# Patient Record
Sex: FEMALE | Race: BLACK OR AFRICAN AMERICAN | NOT HISPANIC OR LATINO | Employment: FULL TIME | URBAN - METROPOLITAN AREA
[De-identification: names, ages, dates, MRNs, and addresses within clinical notes are randomized per-mention and may not be internally consistent; named-entity substitution may affect disease eponyms.]

---

## 2018-03-05 ENCOUNTER — LAB VISIT (OUTPATIENT)
Dept: LAB | Facility: HOSPITAL | Age: 33
End: 2018-03-05
Attending: OBSTETRICS & GYNECOLOGY
Payer: COMMERCIAL

## 2018-03-05 ENCOUNTER — OFFICE VISIT (OUTPATIENT)
Dept: OBSTETRICS AND GYNECOLOGY | Facility: CLINIC | Age: 33
End: 2018-03-05
Payer: COMMERCIAL

## 2018-03-05 DIAGNOSIS — Z11.3 SCREENING EXAMINATION FOR STD (SEXUALLY TRANSMITTED DISEASE): ICD-10-CM

## 2018-03-05 DIAGNOSIS — Z32.01 POSITIVE PREGNANCY TEST: Primary | ICD-10-CM

## 2018-03-05 DIAGNOSIS — Z32.01 POSITIVE PREGNANCY TEST: ICD-10-CM

## 2018-03-05 DIAGNOSIS — O09.521 AMA (ADVANCED MATERNAL AGE) MULTIGRAVIDA 35+, FIRST TRIMESTER: ICD-10-CM

## 2018-03-05 DIAGNOSIS — R11.15 INTRACTABLE CYCLICAL VOMITING WITH NAUSEA: ICD-10-CM

## 2018-03-05 DIAGNOSIS — Z12.4 PAP SMEAR FOR CERVICAL CANCER SCREENING: ICD-10-CM

## 2018-03-05 LAB
ABO + RH BLD: NORMAL
ALBUMIN SERPL BCP-MCNC: 4.7 G/DL
ALP SERPL-CCNC: 75 U/L
ALT SERPL W/O P-5'-P-CCNC: 13 U/L
ANION GAP SERPL CALC-SCNC: 15 MMOL/L
AST SERPL-CCNC: 17 U/L
B-HCG UR QL: POSITIVE
BASOPHILS # BLD AUTO: 0.02 K/UL
BASOPHILS NFR BLD: 0.2 %
BILIRUB SERPL-MCNC: 0.8 MG/DL
BLD GP AB SCN CELLS X3 SERPL QL: NORMAL
BUN SERPL-MCNC: 8 MG/DL
CALCIUM SERPL-MCNC: 10.7 MG/DL
CHLORIDE SERPL-SCNC: 99 MMOL/L
CO2 SERPL-SCNC: 21 MMOL/L
CREAT SERPL-MCNC: 0.8 MG/DL
CTP QC/QA: YES
DIFFERENTIAL METHOD: ABNORMAL
EOSINOPHIL # BLD AUTO: 0 K/UL
EOSINOPHIL NFR BLD: 0 %
ERYTHROCYTE [DISTWIDTH] IN BLOOD BY AUTOMATED COUNT: 13.9 %
EST. GFR  (AFRICAN AMERICAN): >60 ML/MIN/1.73 M^2
EST. GFR  (NON AFRICAN AMERICAN): >60 ML/MIN/1.73 M^2
GLUCOSE SERPL-MCNC: 78 MG/DL
HCG INTACT+B SERPL-ACNC: NORMAL MIU/ML
HCT VFR BLD AUTO: 38.5 %
HGB BLD-MCNC: 12.8 G/DL
LYMPHOCYTES # BLD AUTO: 1.5 K/UL
LYMPHOCYTES NFR BLD: 14.8 %
MCH RBC QN AUTO: 26.8 PG
MCHC RBC AUTO-ENTMCNC: 33.2 G/DL
MCV RBC AUTO: 81 FL
MONOCYTES # BLD AUTO: 0.8 K/UL
MONOCYTES NFR BLD: 8.4 %
NEUTROPHILS # BLD AUTO: 7.5 K/UL
NEUTROPHILS NFR BLD: 76.4 %
PLATELET # BLD AUTO: 254 K/UL
PMV BLD AUTO: 10.5 FL
POTASSIUM SERPL-SCNC: 4.1 MMOL/L
PROT SERPL-MCNC: 8.8 G/DL
RBC # BLD AUTO: 4.77 M/UL
SODIUM SERPL-SCNC: 135 MMOL/L
WBC # BLD AUTO: 9.81 K/UL

## 2018-03-05 PROCEDURE — 85025 COMPLETE CBC W/AUTO DIFF WBC: CPT

## 2018-03-05 PROCEDURE — 80053 COMPREHEN METABOLIC PANEL: CPT

## 2018-03-05 PROCEDURE — 87340 HEPATITIS B SURFACE AG IA: CPT

## 2018-03-05 PROCEDURE — 99999 PR PBB SHADOW E&M-NEW PATIENT-LVL III: CPT | Mod: PBBFAC,,, | Performed by: OBSTETRICS & GYNECOLOGY

## 2018-03-05 PROCEDURE — 86592 SYPHILIS TEST NON-TREP QUAL: CPT

## 2018-03-05 PROCEDURE — 81025 URINE PREGNANCY TEST: CPT | Mod: S$GLB,,, | Performed by: OBSTETRICS & GYNECOLOGY

## 2018-03-05 PROCEDURE — 86850 RBC ANTIBODY SCREEN: CPT

## 2018-03-05 PROCEDURE — 86762 RUBELLA ANTIBODY: CPT

## 2018-03-05 PROCEDURE — 86703 HIV-1/HIV-2 1 RESULT ANTBDY: CPT

## 2018-03-05 PROCEDURE — 84702 CHORIONIC GONADOTROPIN TEST: CPT

## 2018-03-05 PROCEDURE — 99203 OFFICE O/P NEW LOW 30 MIN: CPT | Mod: S$GLB,,, | Performed by: OBSTETRICS & GYNECOLOGY

## 2018-03-05 PROCEDURE — 88175 CYTOPATH C/V AUTO FLUID REDO: CPT

## 2018-03-05 PROCEDURE — 87086 URINE CULTURE/COLONY COUNT: CPT

## 2018-03-05 PROCEDURE — 87624 HPV HI-RISK TYP POOLED RSLT: CPT

## 2018-03-05 PROCEDURE — 87480 CANDIDA DNA DIR PROBE: CPT

## 2018-03-05 PROCEDURE — 87491 CHLMYD TRACH DNA AMP PROBE: CPT

## 2018-03-05 RX ORDER — ONDANSETRON 4 MG/1
4 TABLET, ORALLY DISINTEGRATING ORAL
Qty: 30 TABLET | Refills: 1 | Status: SHIPPED | OUTPATIENT
Start: 2018-03-05 | End: 2018-04-04

## 2018-03-05 RX ORDER — PROMETHAZINE HYDROCHLORIDE 25 MG/1
25 TABLET ORAL
Qty: 30 TABLET | Refills: 1 | Status: SHIPPED | OUTPATIENT
Start: 2018-03-05 | End: 2018-04-04

## 2018-03-05 NOTE — PROGRESS NOTES
CC: positive pregnancy test     HPI:   Loulou Gann 33 y.o.  at 4  is here for + UPT. She hasn't seen anyone yet for this pregnancy. She complains of N/V. She reports she hasn't been able to keep anything down for past 2 days but before that was fine.      Ob: 1  at  in  2/2 NRFHT at 40 wga weight 7lb 8 oz, 1 TAB with D&C      Patient's last menstrual period was 2018.     History reviewed. No pertinent past medical history.    History reviewed. No pertinent surgical history.    History reviewed. No pertinent family history.    Social History     Social History    Marital status: Single     Spouse name: N/A    Number of children: N/A    Years of education: N/A     Occupational History    Not on file.     Social History Main Topics    Smoking status: Not on file    Smokeless tobacco: Not on file    Alcohol use Not on file    Drug use: Unknown    Sexual activity: Not on file     Other Topics Concern    Not on file     Social History Narrative    No narrative on file       OB History      Para Term  AB Living    1              SAB TAB Ectopic Multiple Live Births                        COMPREHENSIVE GYN HISTORY:  PAP History: Had abn with colpo several years ago but has been normal since then.   Infection History: Denies STDs. Denies PID.  Benign History: Denies uterine fibroids. Denies ovarian cysts. Denies endometriosis.   Cancer History: Denies cervical cancer. Denies uterine cancer or hyperplasia. Denies ovarian cancer. Denies vulvar cancer or pre-cancer. Denies vaginal cancer or pre-cancer. Denies breast cancer. Denies colon cancer.  Sexual Activity History:   has no sexual activity history on file.   Has history of lsc ovary removed, unsure which one, 2/2 infection  At  in  but denies PID?         ROS:  GENERAL: Denies weight gain or weight loss. Feeling well overall.   SKIN: Denies rash or lesions.   HEAD: Denies headache.   NODES: Denies enlarged lymph  nodes.   CHEST: Denies shortness of breath or chest pain   ABDOMEN: No abdominal pain, constipation, diarrhea, nausea, vomiting or rectal bleeding.   URINARY: No frequency, dysuria, hematuria, or burning on urination.  REPRODUCTIVE: See HPI.   BREASTS: The patient denies pain, lumps, or nipple discharge.   HEMATOLOGIC: No easy bruisability.   MUSCULOSKELETAL: Denies joint pain or back pain.   NEUROLOGIC: Denies weakness.   PSYCHIATRIC: Denies depression, anxiety or mood swings.    PE:   LMP 02/01/2018     APPEARANCE: Well nourished, well developed, in no acute distress.  NECK: Neck symmetric without  thyromegaly.  NODES: No inguinal, cervical lymph node enlargement.  CHEST: Lungs clear to auscultation.  HEART: Regular rate and rhythm, no murmurs, rubs or gallops.  ABDOMEN: Soft. No tenderness or masses. No hernias.  BREASTS: Symmetrical, no skin changes or visible lesions. No palpable masses, nipple discharge or adenopathy bilaterally.  PELVIC:   VULVA: No lesions. Normal female genitalia.  URETHRAL MEATUS: Normal size and location, no lesions, no prolapse.  URETHRA: No masses, tenderness, prolapse or scarring.  VAGINA: Moist and well rugated, no discharge, no significant cystocele or rectocele.  CERVIX: No lesions and discharge.  UTERUS: 6 wk size, regular shape, mobile, non-tender, bladder base nontender.  ADNEXA: No masses or tenderness.    Urine dip + ketones     1. Positive pregnancy test    2. Screening examination for STD (sexually transmitted disease)    3. Pap smear for cervical cancer screening    4. Intractable cyclical vomiting with nausea    5. AMA (advanced maternal age) multigravida 35+, first trimester        Plan:    1. IOB labs and dating US ordered, PNV ordered.   2. Discussed basic N/V precautions. Eating small meals, dejon, vitamin B6 and 1/2 unisom but will also do phenergen and zofran. We discussed the possible associated risks with zofran but at this point she needs a medication she can take  and not have to swallow. Discussed risk of constipation and what to do about it. Will see back early next week to check weight and follow up. Discussed if not improved in 1-2 days should call office and we may need to send her to ER for IVF.   3. Will discuss risk of AMA once N/V improved.   4. Will discuss TOLAC vs  once N/V improved.

## 2018-03-06 LAB
C TRACH DNA SPEC QL NAA+PROBE: NOT DETECTED
CANDIDA RRNA VAG QL PROBE: NEGATIVE
G VAGINALIS RRNA GENITAL QL PROBE: POSITIVE
HBV SURFACE AG SERPL QL IA: NEGATIVE
HIV 1+2 AB+HIV1 P24 AG SERPL QL IA: NEGATIVE
N GONORRHOEA DNA SPEC QL NAA+PROBE: NOT DETECTED
RPR SER QL: NORMAL
RUBV IGG SER-ACNC: 12.3 IU/ML
RUBV IGG SER-IMP: REACTIVE
T VAGINALIS RRNA GENITAL QL PROBE: POSITIVE

## 2018-03-07 ENCOUNTER — TELEPHONE (OUTPATIENT)
Dept: OBSTETRICS AND GYNECOLOGY | Facility: CLINIC | Age: 33
End: 2018-03-07

## 2018-03-07 LAB
BACTERIA UR CULT: NORMAL
HPV16 AG SPEC QL: NEGATIVE
HPV16+18+H RISK 12 DNA CVX-IMP: NEGATIVE
HPV18 DNA SPEC QL NAA+PROBE: NEGATIVE

## 2018-03-07 RX ORDER — METRONIDAZOLE 500 MG/1
500 TABLET ORAL EVERY 12 HOURS
Qty: 14 TABLET | Refills: 0 | Status: SHIPPED | OUTPATIENT
Start: 2018-03-07 | End: 2018-03-14

## 2018-03-07 NOTE — TELEPHONE ENCOUNTER
----- Message from Mame Perdue sent at 3/7/2018  8:17 AM CST -----  Contact: self/[phone]  She is pregnant and would like to know what can she take for the heartburn beside rolaids.

## 2018-03-07 NOTE — TELEPHONE ENCOUNTER
Call patient to tell her she test + for trich and Bv. She didn't answer so message left for her to call us back. If she calls back please let her know. Please ask how her Nausea/emesis is doing and let her know this medication can sometimes leave a bad taste in her mouth or cause further nausea so we may have to wait until she is feeling better to take it. I would recommend taking a nausea medication about 30 minutes before trying to take it to see if that helps her tolerate it better.

## 2018-03-08 NOTE — TELEPHONE ENCOUNTER
She make take over the counter pepcid twice a day. This will help prevent the heartburn. Eat small meals throughout the day. Avoid spicy or greasy foods. Sit up for at least 30 minutes after eating.

## 2018-03-08 NOTE — TELEPHONE ENCOUNTER
Attempted to return pt's call, called the number in the chart, and a male answered saying that I had the wrong number.

## 2018-03-08 NOTE — TELEPHONE ENCOUNTER
----- Message from Garrick Clark MA sent at 3/7/2018  4:49 PM CST -----  Contact: self/[phone]      ----- Message -----  From: Mame Perdue  Sent: 3/7/2018   8:17 AM  To: [facility] Staff    She is pregnant and would like to know what can she take for the heartburn beside rolaids.

## 2018-03-14 ENCOUNTER — PROCEDURE VISIT (OUTPATIENT)
Dept: OBSTETRICS AND GYNECOLOGY | Facility: CLINIC | Age: 33
End: 2018-03-14
Payer: COMMERCIAL

## 2018-03-14 ENCOUNTER — INITIAL PRENATAL (OUTPATIENT)
Dept: OBSTETRICS AND GYNECOLOGY | Facility: CLINIC | Age: 33
End: 2018-03-14
Payer: COMMERCIAL

## 2018-03-14 VITALS — HEART RATE: 72 BPM | WEIGHT: 138 LBS | SYSTOLIC BLOOD PRESSURE: 134 MMHG | DIASTOLIC BLOOD PRESSURE: 82 MMHG

## 2018-03-14 DIAGNOSIS — R11.15 INTRACTABLE CYCLICAL VOMITING WITH NAUSEA: ICD-10-CM

## 2018-03-14 DIAGNOSIS — O30.009 TWIN PREGNANCY, ANTEPARTUM: Primary | ICD-10-CM

## 2018-03-14 DIAGNOSIS — Z3A.01 7 WEEKS GESTATION OF PREGNANCY: Primary | ICD-10-CM

## 2018-03-14 DIAGNOSIS — Z32.01 POSITIVE PREGNANCY TEST: ICD-10-CM

## 2018-03-14 DIAGNOSIS — O09.521 AMA (ADVANCED MATERNAL AGE) MULTIGRAVIDA 35+, FIRST TRIMESTER: ICD-10-CM

## 2018-03-14 DIAGNOSIS — O30.041 DICHORIONIC DIAMNIOTIC TWIN PREGNANCY IN FIRST TRIMESTER: ICD-10-CM

## 2018-03-14 PROCEDURE — 76817 TRANSVAGINAL US OBSTETRIC: CPT | Mod: S$GLB,,, | Performed by: OBSTETRICS & GYNECOLOGY

## 2018-03-14 PROCEDURE — 99999 PR PBB SHADOW E&M-EST. PATIENT-LVL II: CPT | Mod: PBBFAC,,, | Performed by: OBSTETRICS & GYNECOLOGY

## 2018-03-14 PROCEDURE — 0502F SUBSEQUENT PRENATAL CARE: CPT | Mod: S$GLB,,, | Performed by: OBSTETRICS & GYNECOLOGY

## 2018-03-14 RX ORDER — FAMOTIDINE 20 MG/1
20 TABLET, FILM COATED ORAL 2 TIMES DAILY
Qty: 60 TABLET | Refills: 2 | Status: SHIPPED | OUTPATIENT
Start: 2018-03-14 | End: 2018-04-30

## 2018-03-14 NOTE — PROGRESS NOTES
@ 7w4d  1. US today showed twin IUP, di-di. We discussed the risks associated with twins included stillbirth,  delivery, growth issues like IUGR increased risk of DM and HTN disorders in pregnancy. Discussed that we will need to do more frequent US every 4 weeks after 20 wga to measure the growth. We also discussed this is likely why her N/V was intense.   2. N/V: she reports the N/V is almost resolved without needed medications. She throws up once every other day. Desires medication for GERD. Will send in pepcid BID.   3. - Counseled on the risk between maternal age and genetic aneuploidy, including down syndrome.   -We discussed the risks and benefits of screening tests versus definitive genetic testing ([facility] and amniocentesis).  We discussed the limitations of ultrasound in the definitive diagnosis of Down Syndrome and other conditions.  I also discussed with the patient the option of non-invasive prenatal  testing, such as Nt/first trimester screening, Quad screen, Materni T21. She declines testing but will let us know if she changes her mind.

## 2018-04-11 ENCOUNTER — ROUTINE PRENATAL (OUTPATIENT)
Dept: OBSTETRICS AND GYNECOLOGY | Facility: CLINIC | Age: 33
End: 2018-04-11
Payer: COMMERCIAL

## 2018-04-11 VITALS — WEIGHT: 137.38 LBS | SYSTOLIC BLOOD PRESSURE: 120 MMHG | DIASTOLIC BLOOD PRESSURE: 77 MMHG

## 2018-04-11 DIAGNOSIS — Z3A.11 11 WEEKS GESTATION OF PREGNANCY: Primary | ICD-10-CM

## 2018-04-11 DIAGNOSIS — O30.041 DICHORIONIC DIAMNIOTIC TWIN PREGNANCY IN FIRST TRIMESTER: ICD-10-CM

## 2018-04-11 DIAGNOSIS — O09.521 AMA (ADVANCED MATERNAL AGE) MULTIGRAVIDA 35+, FIRST TRIMESTER: ICD-10-CM

## 2018-04-11 PROCEDURE — 99999 PR PBB SHADOW E&M-EST. PATIENT-LVL II: CPT | Mod: PBBFAC,,, | Performed by: OBSTETRICS & GYNECOLOGY

## 2018-04-11 PROCEDURE — 99212 OFFICE O/P EST SF 10 MIN: CPT | Mod: TH,S$GLB,, | Performed by: OBSTETRICS & GYNECOLOGY

## 2018-04-12 NOTE — PROGRESS NOTES
@ 11 5/7 wga   1. C/o depression. She reports she has lost interest in things and seems to be more angry. She reports her job is stressful and she is going to cut back her hours. She reports this pregnancy was unexpected and she was very surprised it was twins. We discussed SSRIs in pregnancy and she is not currently interested in medications or seeing psych. Will keep us updated as to her feelings.   2. N/V resolved and is more spitting than anything. Lost a small amount of weight but no ketones in urine.   3. IOB labs reviewed

## 2018-04-18 ENCOUNTER — NURSE TRIAGE (OUTPATIENT)
Dept: ADMINISTRATIVE | Facility: CLINIC | Age: 33
End: 2018-04-18

## 2018-04-19 ENCOUNTER — PROCEDURE VISIT (OUTPATIENT)
Dept: OBSTETRICS AND GYNECOLOGY | Facility: CLINIC | Age: 33
End: 2018-04-19
Payer: COMMERCIAL

## 2018-04-19 ENCOUNTER — ROUTINE PRENATAL (OUTPATIENT)
Dept: OBSTETRICS AND GYNECOLOGY | Facility: CLINIC | Age: 33
End: 2018-04-19
Payer: COMMERCIAL

## 2018-04-19 VITALS — WEIGHT: 136 LBS | HEART RATE: 84 BPM | SYSTOLIC BLOOD PRESSURE: 122 MMHG | DIASTOLIC BLOOD PRESSURE: 60 MMHG

## 2018-04-19 DIAGNOSIS — O46.90 VAGINAL BLEEDING IN PREGNANCY: ICD-10-CM

## 2018-04-19 DIAGNOSIS — O20.0 THREATENED ABORTION: Primary | ICD-10-CM

## 2018-04-19 DIAGNOSIS — O30.041 DICHORIONIC DIAMNIOTIC TWIN PREGNANCY IN FIRST TRIMESTER: ICD-10-CM

## 2018-04-19 DIAGNOSIS — O30.041 DICHORIONIC DIAMNIOTIC TWIN PREGNANCY IN FIRST TRIMESTER: Primary | ICD-10-CM

## 2018-04-19 DIAGNOSIS — Z3A.12 12 WEEKS GESTATION OF PREGNANCY: ICD-10-CM

## 2018-04-19 DIAGNOSIS — O09.521 AMA (ADVANCED MATERNAL AGE) MULTIGRAVIDA 35+, FIRST TRIMESTER: ICD-10-CM

## 2018-04-19 PROCEDURE — 87480 CANDIDA DNA DIR PROBE: CPT

## 2018-04-19 PROCEDURE — 0502F SUBSEQUENT PRENATAL CARE: CPT | Mod: S$GLB,,, | Performed by: OBSTETRICS & GYNECOLOGY

## 2018-04-19 PROCEDURE — 87510 GARDNER VAG DNA DIR PROBE: CPT

## 2018-04-19 PROCEDURE — 99999 PR PBB SHADOW E&M-EST. PATIENT-LVL III: CPT | Mod: PBBFAC,,, | Performed by: OBSTETRICS & GYNECOLOGY

## 2018-04-19 PROCEDURE — 87086 URINE CULTURE/COLONY COUNT: CPT

## 2018-04-19 PROCEDURE — 76802 OB US < 14 WKS ADDL FETUS: CPT | Mod: S$GLB,,, | Performed by: OBSTETRICS & GYNECOLOGY

## 2018-04-19 PROCEDURE — 76801 OB US < 14 WKS SINGLE FETUS: CPT | Mod: S$GLB,,, | Performed by: OBSTETRICS & GYNECOLOGY

## 2018-04-19 RX ORDER — FLUCONAZOLE 150 MG/1
150 TABLET ORAL ONCE
Qty: 1 TABLET | Refills: 0 | Status: SHIPPED | OUTPATIENT
Start: 2018-04-19 | End: 2018-04-19

## 2018-04-19 NOTE — PROGRESS NOTES
@ 12 5/7 wga   1. Reports she started having spotting, at first pinkish but now brownish when she wipes, hasn't put on a panty liner, treated herself for yeast infection last week, hasn't had intercourse. Denies pain or cramping, had one cramp last week and had a burning when she urinated once yesterday but none today. + brown clumpy discharge c/w yeast. Urine dip with blood and leuk est, likely 2/2 yeast infection and vaginal bleeding but will send affirm and urine for Ua.  Discussed trich which should have been treated with flagyl which she tolerated so will retest today.   Had US shows twin IUP both with FHT, no subchorionic hemorrhage.  2. Denies N/V and reports she has been eating well. Will continue to monitor weight.

## 2018-04-19 NOTE — TELEPHONE ENCOUNTER
"  Reason for Disposition   Burning with urination    Answer Assessment - Initial Assessment Questions  1. ONSET: "When did this bleeding start?"        Light spotting  2. DESCRIPTION: "Describe the bleeding that you are having." "How much bleeding is there?"     - SPOTTING: spotting, or pinkish / brownish mucous discharge; does not fill panti-liner or pad     - MILD:  less than 1 pad / hour; less than patient's usual menstrual bleeding    - MODERATE: 1-2 pads / hour; small-medium blood clots (e.g., pea, grape, small coin)     - SEVERE: soaking 2 or more pads/hour for 2 or more hours; bleeding not contained by pads or continuous red blood from vagina; large blood clots (e.g., golf ball, large coin)       spotting  3. ABDOMINAL PAIN SEVERITY: If present, ask: "How bad is it?"  (e.g., Scale 1-10; mild, moderate, or severe)    - MILD (1-3): doesn't interfere with normal activities, abdomen soft and not tender to touch     - MODERATE (4-7): interferes with normal activities or awakens from sleep, tender to touch     - SEVERE (8-10): excruciating pain, doubled over, unable to do any normal activities      no  4. PREGNANCY: "Do you know how many weeks or months pregnant you are?" "When was the first day of your last normal menstrual period?"      12  5. HEMODYNAMIC STATUS: "Are you weak or feeling lightheaded?" If so, ask: "Can you stand and walk normally?"       no  6. OTHER SYMPTOMS: "What other symptoms are you having with the bleeding?" (e.g., passed tissue, vaginal discharge, fever, menstrual-type cramps)      no    Protocols used: ST PREGNANCY - VAGINAL BLEEDING LESS THAN 20 WEEKS EGA-A-    "

## 2018-04-20 LAB
CANDIDA RRNA VAG QL PROBE: POSITIVE
G VAGINALIS RRNA GENITAL QL PROBE: NEGATIVE
T VAGINALIS RRNA GENITAL QL PROBE: NEGATIVE

## 2018-04-21 LAB
BACTERIA UR CULT: NORMAL
BACTERIA UR CULT: NORMAL

## 2018-04-22 ENCOUNTER — HOSPITAL ENCOUNTER (EMERGENCY)
Facility: HOSPITAL | Age: 33
Discharge: HOME | End: 2018-04-23
Attending: EMERGENCY MEDICINE
Payer: COMMERCIAL

## 2018-04-22 DIAGNOSIS — O20.0 THREATENED ABORTION: Primary | ICD-10-CM

## 2018-04-22 LAB
ALBUMIN SERPL BCP-MCNC: 3.1 G/DL
ALP SERPL-CCNC: 66 U/L
ALT SERPL W/O P-5'-P-CCNC: 14 U/L
ANION GAP SERPL CALC-SCNC: 11 MMOL/L
AST SERPL-CCNC: 14 U/L
BACTERIA #/AREA URNS HPF: ABNORMAL /HPF
BASOPHILS # BLD AUTO: 0.01 K/UL
BASOPHILS NFR BLD: 0.1 %
BILIRUB SERPL-MCNC: 0.2 MG/DL
BILIRUB UR QL STRIP: NEGATIVE
BUN SERPL-MCNC: 10 MG/DL
CALCIUM SERPL-MCNC: 9.3 MG/DL
CHLORIDE SERPL-SCNC: 105 MMOL/L
CLARITY UR: CLEAR
CO2 SERPL-SCNC: 19 MMOL/L
COLOR UR: YELLOW
CREAT SERPL-MCNC: 0.6 MG/DL
DIFFERENTIAL METHOD: ABNORMAL
EOSINOPHIL # BLD AUTO: 0.1 K/UL
EOSINOPHIL NFR BLD: 0.5 %
ERYTHROCYTE [DISTWIDTH] IN BLOOD BY AUTOMATED COUNT: 13.8 %
EST. GFR  (AFRICAN AMERICAN): >60 ML/MIN/1.73 M^2
EST. GFR  (NON AFRICAN AMERICAN): >60 ML/MIN/1.73 M^2
GLUCOSE SERPL-MCNC: 92 MG/DL
GLUCOSE UR QL STRIP: NEGATIVE
HCT VFR BLD AUTO: 32.6 %
HGB BLD-MCNC: 10.7 G/DL
HGB UR QL STRIP: ABNORMAL
KETONES UR QL STRIP: NEGATIVE
LEUKOCYTE ESTERASE UR QL STRIP: NEGATIVE
LYMPHOCYTES # BLD AUTO: 1.8 K/UL
LYMPHOCYTES NFR BLD: 16.2 %
MCH RBC QN AUTO: 26.3 PG
MCHC RBC AUTO-ENTMCNC: 32.8 G/DL
MCV RBC AUTO: 80 FL
MICROSCOPIC COMMENT: ABNORMAL
MONOCYTES # BLD AUTO: 1 K/UL
MONOCYTES NFR BLD: 8.7 %
NEUTROPHILS # BLD AUTO: 8.4 K/UL
NEUTROPHILS NFR BLD: 73.9 %
NITRITE UR QL STRIP: NEGATIVE
PH UR STRIP: 6 [PH] (ref 5–8)
PLATELET # BLD AUTO: 254 K/UL
PMV BLD AUTO: 9.6 FL
POTASSIUM SERPL-SCNC: 3.8 MMOL/L
PROT SERPL-MCNC: 6.5 G/DL
PROT UR QL STRIP: NEGATIVE
RBC # BLD AUTO: 4.07 M/UL
RBC #/AREA URNS HPF: 5 /HPF (ref 0–4)
SODIUM SERPL-SCNC: 135 MMOL/L
SP GR UR STRIP: 1.02 (ref 1–1.03)
SQUAMOUS #/AREA URNS HPF: 4 /HPF
URN SPEC COLLECT METH UR: ABNORMAL
UROBILINOGEN UR STRIP-ACNC: NEGATIVE EU/DL
WBC # BLD AUTO: 11.32 K/UL
WBC #/AREA URNS HPF: 3 /HPF (ref 0–5)

## 2018-04-22 PROCEDURE — 80053 COMPREHEN METABOLIC PANEL: CPT

## 2018-04-22 PROCEDURE — 85025 COMPLETE CBC W/AUTO DIFF WBC: CPT

## 2018-04-22 PROCEDURE — 99284 EMERGENCY DEPT VISIT MOD MDM: CPT

## 2018-04-22 PROCEDURE — 84702 CHORIONIC GONADOTROPIN TEST: CPT

## 2018-04-22 PROCEDURE — 86901 BLOOD TYPING SEROLOGIC RH(D): CPT

## 2018-04-22 PROCEDURE — 81000 URINALYSIS NONAUTO W/SCOPE: CPT

## 2018-04-23 ENCOUNTER — TELEPHONE (OUTPATIENT)
Dept: OBSTETRICS AND GYNECOLOGY | Facility: HOSPITAL | Age: 33
End: 2018-04-23

## 2018-04-23 VITALS
TEMPERATURE: 99 F | RESPIRATION RATE: 16 BRPM | BODY MASS INDEX: 24.1 KG/M2 | HEART RATE: 73 BPM | SYSTOLIC BLOOD PRESSURE: 128 MMHG | OXYGEN SATURATION: 98 % | HEIGHT: 63 IN | DIASTOLIC BLOOD PRESSURE: 68 MMHG | WEIGHT: 136 LBS

## 2018-04-23 LAB
ABO + RH BLD: NORMAL
BLD GP AB SCN CELLS X3 SERPL QL: NORMAL
HCG INTACT+B SERPL-ACNC: NORMAL MIU/ML

## 2018-04-23 NOTE — ED NOTES
Pt to ED with complaints of vaginal bleeding. Pt states that she had some spotting on April 18, Wednesday, that ended by April 20, Friday. States she visited her OBGYN on the 19th, Thursday, to which her US was stated to be fine with no abnormalities with the pt's ovaries. Pt is 13 weeks pregnant by this time. States that by today around 2030, she began to have heavier spotting than her previous occurrence. States that she notices more blood when wiping. States very slight pain rated at a 1 out of 10 to the lower anterior pelvis.     APPEARANCE: Alert, oriented and in no acute distress.  CARDIAC: Normal rate and rhythm. S1S2 noted  PERIPHERAL VASCULAR: peripheral pulses present. Normal cap refill. No edema. Warm to touch. 2+ radial pulses  RESPIRATORY:Normal rate and effort, breath sounds clear bilaterally throughout chest. Respirations are equal and unlabored no obvious signs of distress.  MUSC: Full ROM. No bony tenderness or soft tissue tenderness. No obvious deformity.  : Pt states 1 out of 10 pain to the anterior inferior pelvis. States she has had bleeding from her vagina starting around 2030 tonight, with the pain occurring after initial spotting.   SKIN: Skin is warm and dry, normal skin turgor, mucous membranes moist.  NEURO: 5/5 strength major flexors/extensors bilaterally. Sensory intact to light touch bilaterally. [place] coma scale: eyes open spontaneously-4, oriented & converses-5, obeys commands-6. No neurological abnormalities.   MENTAL STATUS: awake, alert and aware of environment.  EYE: No obvious discharge.  ENT: EARS: no obvious drainage. NOSE: no active bleeding.

## 2018-04-23 NOTE — ED PROVIDER NOTES
Encounter Date: 4/22/2018    SCRIBE #1 NOTE: IViry, sandra scribing for, and in the presence of, Dr. Leonard Vega.       History     Chief Complaint   Patient presents with    Vaginal Bleeding     33y F ambulatory to ED with c/o vaginal spotting. pt is 13 weeks pregnant. pt started spotting on wednesday, was seen by OBGYN on thursday, and has started spotting again tonight.      Time seen by provider: 12:41 AM     This is a 33 y.o. female, 13 weeks pregnant, who presents to the emergency department today with complaint of intermittent vaginal spotting onset 4 days ago. She denies abdominal pain, any other discomfort.    Patient has a past surgical history that includes Oophorectomy.       The history is provided by the patient.     Review of patient's allergies indicates:  No Known Allergies  History reviewed. No pertinent past medical history.  Past Surgical History:   Procedure Laterality Date    OOPHORECTOMY       History reviewed. No pertinent family history.  Social History   Substance Use Topics    Smoking status: Never Smoker    Smokeless tobacco: Never Used    Alcohol use No     Review of Systems   Gastrointestinal: Negative for abdominal pain.   Genitourinary: Positive for vaginal bleeding.   All other systems reviewed and are negative.      Physical Exam     Initial Vitals [04/22/18 2223]   BP Pulse Resp Temp SpO2   122/63 96 18 98.6 °F (37 °C) 100 %      MAP       82.67         Physical Exam    Nursing note and vitals reviewed.  Constitutional: She appears well-developed and well-nourished. No distress.   HENT:   Head: Normocephalic and atraumatic.   Mouth/Throat: Oropharynx is clear and moist.   Eyes: Conjunctivae and EOM are normal. No scleral icterus.   Neck: Normal range of motion. Neck supple.   Pulmonary/Chest: No respiratory distress.   Abdominal: Soft. She exhibits no distension. There is no tenderness.   Genitourinary:   Genitourinary Comments: Some brownish discharge in vault, cervical  os is closed. No uterine tenderness.   Musculoskeletal: Normal range of motion.   Neurological: She is alert and oriented to person, place, and time. She has normal strength.   Skin: Skin is warm and dry.   Psychiatric: She has a normal mood and affect.         ED Course   Procedures  Labs Reviewed   CBC W/ AUTO DIFFERENTIAL - Abnormal; Notable for the following:        Result Value    Hemoglobin 10.7 (*)     Hematocrit 32.6 (*)     MCV 80 (*)     MCH 26.3 (*)     Gran # (ANC) 8.4 (*)     Gran% 73.9 (*)     Lymph% 16.2 (*)     All other components within normal limits   COMPREHENSIVE METABOLIC PANEL - Abnormal; Notable for the following:     Sodium 135 (*)     CO2 19 (*)     Albumin 3.1 (*)     All other components within normal limits   URINALYSIS - Abnormal; Notable for the following:     Occult Blood UA 3+ (*)     All other components within normal limits   URINALYSIS MICROSCOPIC - Abnormal; Notable for the following:     RBC, UA 5 (*)     All other components within normal limits   HCG, QUANTITATIVE, PREGNANCY   TYPE & SCREEN        Imaging Results          US OB Limited 1 Or More Gestations (Final result)  Result time 04/23/18 00:26:00   Procedure changed from US OB Less Than 14 Wks with Transvag(xpd     Final result by Silvio Roman MD (04/23/18 00:26:00)                 Impression:      Dichorionic-diamniotic intrauterine gestation corresponding to 14 weeks and 1 day and 13 weeks and 6 days with expected date of delivery of 10/20-22/2018.  Additional details as above.  No complications noted.      Electronically signed by: Silvio Roman MD  Date:    04/23/2018  Time:    00:26             Narrative:    EXAMINATION:  US OB LIMITED 1 OR MORE GESTATIONS    CLINICAL HISTORY:  Vag Bleeding;    TECHNIQUE:  Limited transabdominal pelvic ultrasound was performed.    COMPARISON:  None    FINDINGS:  There is a dichorionic diamniotic intrauterine gestation.  The cervix is closed.  The cervical length is within normal  "limits measuring 4.3 cm.    Baby "A",  which is to the maternal left has average gestational age of 14 weeks and 1 day.  The expected date of delivery is 10/20/2018.  The fetal heart rate is 161 beats per minute.  The assessed fetal biometrics including the biparietal diameter, head circumference, abdominal circumference, and femur length are within normal limits.  The placenta is located posteriorly.  The amniotic fluid appears adequate on visual assessment.  No perigestational collection is identified.    Baby "B", which is to the maternal right has a gestational age of 13 weeks and 6 days with expected date of delivery of 10/22/2018.  The fetal heart rate is 167 beats per minute.  The assessed fetal biometrics including the biparietal diameter, head circumference, abdominal circumference, and femur length are within normal limits.  The placenta is located anteriorly. The amniotic fluid appears adequate on visual assessment. No perigestational collection is identified.                                  Medical Decision Making:   ED Management:  Cervix is closed on exam. Scant brownish discharge. Pt will be dc'd home w instructions to call ob tomorrow              Attending Attestation:           Physician Attestation for Scribe:  Physician Attestation Statement for Scribe #1: I, Nick Vega, reviewed documentation, as scribed by Viry Santos in my presence, and it is both accurate and complete.                    Clinical Impression:     1. Threatened          Disposition:   Disposition: Discharged  Condition: Stable                         Nick Vega MD  [phone]    "

## 2018-04-23 NOTE — TELEPHONE ENCOUNTER
Please let patient know that her vaginal swab was + for yeast like we suspected. The diflucan should help clear that up but if it is still occurring then let us know and we can treat it again. It was negative for the trichomonas.

## 2018-04-26 ENCOUNTER — PATIENT MESSAGE (OUTPATIENT)
Dept: OBSTETRICS AND GYNECOLOGY | Facility: CLINIC | Age: 33
End: 2018-04-26

## 2018-04-27 ENCOUNTER — TELEPHONE (OUTPATIENT)
Dept: OBSTETRICS AND GYNECOLOGY | Facility: CLINIC | Age: 33
End: 2018-04-27

## 2018-04-27 NOTE — TELEPHONE ENCOUNTER
----- Message from Yoli Kitchen sent at 4/27/2018  9:53 AM CDT -----  Contact: Self [phone]  Patient Returning Your Phone Call

## 2018-04-27 NOTE — TELEPHONE ENCOUNTER
----- Message from Shelby Bull sent at 4/27/2018  9:02 AM CDT -----  No. [phone]   OB patient has an appointment on 5/9/18.  She is still having a brown discharge.  Does she need to come in sooner.    Please call.

## 2018-04-30 ENCOUNTER — ROUTINE PRENATAL (OUTPATIENT)
Dept: OBSTETRICS AND GYNECOLOGY | Facility: CLINIC | Age: 33
End: 2018-04-30
Payer: COMMERCIAL

## 2018-04-30 VITALS
WEIGHT: 138.44 LBS | DIASTOLIC BLOOD PRESSURE: 70 MMHG | BODY MASS INDEX: 24.53 KG/M2 | SYSTOLIC BLOOD PRESSURE: 106 MMHG

## 2018-04-30 DIAGNOSIS — O09.521 AMA (ADVANCED MATERNAL AGE) MULTIGRAVIDA 35+, FIRST TRIMESTER: ICD-10-CM

## 2018-04-30 DIAGNOSIS — O46.90 VAGINAL BLEEDING IN PREGNANCY: Primary | ICD-10-CM

## 2018-04-30 DIAGNOSIS — N89.8 VAGINAL DISCHARGE: ICD-10-CM

## 2018-04-30 DIAGNOSIS — O30.041 DICHORIONIC DIAMNIOTIC TWIN PREGNANCY IN FIRST TRIMESTER: ICD-10-CM

## 2018-04-30 PROCEDURE — 99212 OFFICE O/P EST SF 10 MIN: CPT | Mod: TH,S$GLB,, | Performed by: OBSTETRICS & GYNECOLOGY

## 2018-04-30 PROCEDURE — 87510 GARDNER VAG DNA DIR PROBE: CPT

## 2018-04-30 PROCEDURE — 99999 PR PBB SHADOW E&M-EST. PATIENT-LVL III: CPT | Mod: PBBFAC,,, | Performed by: OBSTETRICS & GYNECOLOGY

## 2018-04-30 PROCEDURE — 87491 CHLMYD TRACH DNA AMP PROBE: CPT

## 2018-04-30 PROCEDURE — 87480 CANDIDA DNA DIR PROBE: CPT

## 2018-04-30 RX ORDER — TERCONAZOLE 4 MG/G
1 CREAM VAGINAL DAILY
Qty: 1 TUBE | Refills: 1 | Status: SHIPPED | OUTPATIENT
Start: 2018-04-30 | End: 2018-05-16

## 2018-04-30 NOTE — PROGRESS NOTES
@ 14 2/7 wga   1. Di-di twins with vaginal bleeding: alternates between bright red and brown and clumpy. Had improved after treatment for yeast and is no longer having itching. Is a scant amount though last Sunday had increased to having to put on a panty liner that was heavier but no clots and didn't saturate a whole pad and only lasted a few hours but now is very scant brown. We treated for the yeast.  On pelvic exam: cervix closed, a scant amount of brown clumpy discharge still c/w yeast infection mixed with old blood, no active VB seen. Normal appearing nabothian cyst at 6 oclock on cervix, cervix closed on exam. Has had several US with no obvious cause of bleeding. US shows di-di twins with no obvious cause of bleeding, MVP 3.9/4 and FHT normal on both.  I suspect it is a cervicitis type picture 2/2 chronic yeast, will treat with vaginal cream. We discussed bleeding precautions and that we can't always seen bleeding on ultrasound. However will continue pelvic rest. If not resolved in a week will make appointment with M.   2. Nausea/emeisis resolved.

## 2018-05-01 LAB
CANDIDA RRNA VAG QL PROBE: NEGATIVE
G VAGINALIS RRNA GENITAL QL PROBE: NEGATIVE
T VAGINALIS RRNA GENITAL QL PROBE: NEGATIVE

## 2018-05-03 LAB
C TRACH DNA SPEC QL NAA+PROBE: NOT DETECTED
N GONORRHOEA DNA SPEC QL NAA+PROBE: NOT DETECTED

## 2018-05-09 ENCOUNTER — ROUTINE PRENATAL (OUTPATIENT)
Dept: OBSTETRICS AND GYNECOLOGY | Facility: CLINIC | Age: 33
End: 2018-05-09
Payer: COMMERCIAL

## 2018-05-09 VITALS
BODY MASS INDEX: 24.37 KG/M2 | SYSTOLIC BLOOD PRESSURE: 112 MMHG | WEIGHT: 137.56 LBS | DIASTOLIC BLOOD PRESSURE: 64 MMHG

## 2018-05-09 DIAGNOSIS — O30.041 DICHORIONIC DIAMNIOTIC TWIN PREGNANCY IN FIRST TRIMESTER: ICD-10-CM

## 2018-05-09 DIAGNOSIS — Z3A.15 15 WEEKS GESTATION OF PREGNANCY: Primary | ICD-10-CM

## 2018-05-09 DIAGNOSIS — O09.521 AMA (ADVANCED MATERNAL AGE) MULTIGRAVIDA 35+, FIRST TRIMESTER: ICD-10-CM

## 2018-05-09 PROCEDURE — 0502F SUBSEQUENT PRENATAL CARE: CPT | Mod: S$GLB,,, | Performed by: OBSTETRICS & GYNECOLOGY

## 2018-05-09 PROCEDURE — 99999 PR PBB SHADOW E&M-EST. PATIENT-LVL II: CPT | Mod: PBBFAC,,, | Performed by: OBSTETRICS & GYNECOLOGY

## 2018-05-09 NOTE — PROGRESS NOTES
@ 15 4/7 wga   1. Spotting stopped after second treatment for yeast  2. No nausea but lost 1lbs, encouraged to eat more frequent meals  3. Will wait 2-3 more weeks and VB doesn't restart then will start low dose ASA   4. Has MFM appt next week.

## 2018-05-16 ENCOUNTER — OFFICE VISIT (OUTPATIENT)
Dept: MATERNAL FETAL MEDICINE | Facility: HOSPITAL | Age: 33
End: 2018-05-16
Attending: OBSTETRICS & GYNECOLOGY
Payer: COMMERCIAL

## 2018-05-16 VITALS — WEIGHT: 136 LBS | SYSTOLIC BLOOD PRESSURE: 100 MMHG | DIASTOLIC BLOOD PRESSURE: 60 MMHG | BODY MASS INDEX: 24.09 KG/M2

## 2018-05-16 DIAGNOSIS — O30.042 DICHORIONIC DIAMNIOTIC TWIN PREGNANCY IN SECOND TRIMESTER: ICD-10-CM

## 2018-05-16 DIAGNOSIS — O34.219 PREGNANCY WITH HISTORY OF CESAREAN SECTION, ANTEPARTUM: ICD-10-CM

## 2018-05-16 DIAGNOSIS — O30.002 TWIN GESTATION IN SECOND TRIMESTER, UNSPECIFIED MULTIPLE GESTATION TYPE: Primary | ICD-10-CM

## 2018-05-16 DIAGNOSIS — Z36.3 ANTENATAL SCREENING FOR MALFORMATION USING ULTRASONICS: ICD-10-CM

## 2018-05-16 DIAGNOSIS — O09.522 ELDERLY MULTIGRAVIDA IN SECOND TRIMESTER: ICD-10-CM

## 2018-05-16 PROCEDURE — 76816 OB US FOLLOW-UP PER FETUS: CPT | Mod: 59

## 2018-05-16 PROCEDURE — 76817 TRANSVAGINAL US OBSTETRIC: CPT

## 2018-05-16 PROCEDURE — 3008F BODY MASS INDEX DOCD: CPT | Mod: CPTII,,, | Performed by: OBSTETRICS & GYNECOLOGY

## 2018-05-16 PROCEDURE — 99203 OFFICE O/P NEW LOW 30 MIN: CPT | Mod: 25,,, | Performed by: OBSTETRICS & GYNECOLOGY

## 2018-05-16 PROCEDURE — 76816 OB US FOLLOW-UP PER FETUS: CPT | Mod: 26,59,, | Performed by: OBSTETRICS & GYNECOLOGY

## 2018-05-16 PROCEDURE — 76817 TRANSVAGINAL US OBSTETRIC: CPT | Mod: 26,,, | Performed by: OBSTETRICS & GYNECOLOGY

## 2018-05-16 NOTE — PROGRESS NOTES
Indication  ========    AMA, Di/Di twin gestation: limited fetal evaluation and cervical length screen (Dr. Beach).    History  ======    General History  Other: Di/Di twin gestation  AMA  C/S x 1  Previous Outcomes  Preg. no. 1  Outcome: Live birth  Gender: male  Details: C/S   2  Para 1  [facility] living children (L) 1    Pregnancy History  ==============    Maternal Lab Tests  Genetic screening declined.      Maternal Assessment  =================    Weight 62 kg  Weight (lb) 136 lb  BP syst 100 mmHg  BP diast 70 mmHg    Method  ======    Transabdominal ultrasound examination. View: Good view.    Pregnancy  =========    Twin pregnancy. Dichorionic-diamniotic. Number of fetuses: 2.    Dating  ======    LMP on: 2018  Cycle: regular cycle  GA by LMP 14 w + 6 d  MARY by LMP: 2018  Ultrasound examination on: 2018  GA by U/S based upon: AC, BPD, Femur, HC  GA by U/S 17 w + 2 d  MARY by U/S: 10/22/2018  GA by U/S based upon (Fetus 2): AC, BPD, Femur, HC  GA by U/S (Fetus 2) 16 w + 6 d  MARY by U/S (Fetus 2): 10/25/2018  Assigned: Dating performed on 2018, based on ultrasound (Pregnancy 1: CRL)  Assigned GA 16 w + 4 d  Assigned MARY: 10/27/2018    General Evaluation (1)  =================    Cardiac activity: present.  bpm.  Fetal movements: visualized.  Presentation: cephalic left; presenting twin.  Placenta:  Placental site: posterior.  Umbilical cord: Cord vessels: 3 vessel cord. Cord insertion: placental insertion: normal.  Amniotic fluid: Amount of AF: normal amount.    Fetal Biometry (1)  ==============    Fetal Biometry  BPD 37.6 mm 17w 3d Hadlock  .5 mm 17w 3d Hadlock  .7 mm 17w 1d Hadlock  Femur 24.0 mm 17w 2d Hadlock  Humerus 23.2 mm 17w 1d Teodoro   g  Calculated by: Hadlock (BPD-HC-AC-FL)  EFW (lb) 0 lb  EFW (oz) 7 oz  EFW discordance 9.2 %  HC / AC 1.24  FL / BPD 0.64  FL / AC 0.21   bpm    Fetal Anatomy (1)  ==============    Cranium: appears  normal  Lateral ventricles: suboptimal  Choroid plexus: normal  Midline falx: suboptimal  Cavum septi pellucidi: suboptimal  Posterior fossa: normal  Lips: suboptimal  Profile: suboptimal  Nose: suboptimal  4-chamber view: suboptimal  RVOT: suboptimal  LVOT: suboptimal  Situs: normal  Aortic arch: suboptimal  Ductal arch: suboptimal  SVC: suboptimal  IVC: suboptimal  3-vessel view: suboptimal  3-vessel-trachea view: suboptimal  Diaphragm: suboptimal  Cord insertion: suboptimal  Stomach: normal  Kidneys: normal  Bladder: normal  Genitals: normal  Cervical spine: normal  Thoracic spine: normal  Lumbar spine: normal  Sacral spine: normal  Rt upper arm: normal  Rt forearm: normal  Rt hand: suboptimal  Lt upper arm: normal  Lt forearm: normal  Lt hand: normal  Rt upper leg: normal  Rt lower leg: suboptimal  Rt foot: suboptimal  Lt upper leg: normal  Lt lower leg: suboptimal  Lt foot: suboptimal  Position of hands: suboptimal  Position of feet: suboptimal  Gender: female  Wants to know gender: yes    General Evaluation (2)  =================    Cardiac activity: present.  bpm.  Fetal movements: visualized.  Presentation: cephalic right.  Placenta:  Placental site: anterior.  Umbilical cord: Cord vessels: 3 vessel cord; placental cord insertion wnl.  Amniotic fluid: Amount of AF: normal amount.    Fetal Biometry (2)  ==============    Fetal Biometry  BPD 34.7 mm 16w 5d Hadlock  .0 mm 17w 0d Hadlock  .5 mm 17w 0d Hadlock  Femur 21.8 mm 16w 4d Hadlock  Humerus 21.2 mm 16w 3d Teodoro   g  Calculated by: Hadlock (BPD-HC-AC-FL)  EFW (lb) 0 lb  EFW (oz) 6 oz  EFW discordance 9.2 %  HC / AC 1.21  FL / BPD 0.63  FL / AC 0.20   bpm    Fetal Anatomy (2)  ==============    Cranium: appears normal  Lateral ventricles: suboptimal  Choroid plexus: normal  Midline falx: suboptimal  Cavum septi pellucidi: suboptimal  Posterior fossa: normal  Lips: suboptimal  Profile: suboptimal  Nose: suboptimal  4-chamber  "view: suboptimal  RVOT: suboptimal  LVOT: suboptimal  Situs: normal  Aortic arch: suboptimal  Ductal arch: suboptimal  SVC: suboptimal  IVC: suboptimal  3-vessel view: suboptimal  3-vessel-trachea view: suboptimal  Diaphragm: suboptimal  Cord insertion: normal  Stomach: normal  Kidneys: normal  Bladder: normal  Genitals: normal  Rt upper arm: normal  Rt forearm: normal  Rt hand: normal  Lt upper arm: normal  Lt forearm: normal  Lt hand: normal  Rt upper leg: normal  Rt lower leg: normal  Rt foot: normal  Lt upper leg: normal  Lt lower leg: normal  Lt foot: normal  Position of feet: normal  Gender: female  Wants to know gender: yes    Maternal Structures  ===============    Uterus / Cervix  Cervix: Normal  Approach: Transvaginal  Cervical length 43.0 mm  Ovaries / Tubes / Adnexa  Rt ovary: removed  Lt ovary: Visualized  Lt ovary D1 2.7 cm  Lt ovary D2 2.8 cm  Lt ovary D3 1.4 cm  Lt ovary mean 2.3 cm  Lt ovary vol 5.3 cm³    Consultation  ==========    referral for Di/Di twin gestation    referral from Dr. Beach    33 y.o.  at 16w 4d gestation (Di/Di twin gestation)    AMA:    Ob hx:  2006, term, 7 lbs 8oz, C/S - "failure to progress"    Social hx:  THC stopped with pregnancy    ultrasound performed    Counseling: AMA  Today I counseled the patient on the relationship between maternal age and genetic aneuploidy. We discussed the risks and benefits of  screening tests versus definitive genetic testing (amniocentesis). She was counseled about her age related risk of Down Syndrome. We  discussed the limitations of ultrasound in the definitive diagnosis of Down Syndrome and we discussed amniocentesis as providing definitive  diagnosis. Patient was counseled on the risks associated with an amniocentesis including risk of fetal loss. I also discussed with the patient  the option of non-invasive prenatal testing (NIPT) (ex. Materni T21) including the sensitivity, specificity, and logistics of the screening test. " Her  questions were answered and after today's consultation she declined pursuit of cell free DNA screening or amniocentesis.    Counseling: Di/Di twin gestation  On ultrasound today a twin gestation is noted with an intertwin membrane consistent with a dichorionic-diamniotic twin gestation. Today I  discussed with the patient the finding of dichorionic-diamniotic twin pregnancy and the risks associated with this type of pregnancy. I  counseled her on the increased incidence of preeclampsia, gestational diabetes, fetal growth restriction and  labor that can occur in  twin pregnancies. We discussed plans for monthly ultrasound surveillance looking for signs of fetal growth restriction. I also reviewed with the  patient the ACOG recommendation for delivery at 38 weeks gestation because of the increasing risk for poor pregnancy outcomes in  dichorionic-diamniotic twins that progress past this gestational age. Patient counseled on daily aspirin 81 mg to reduce the risk of developing  preeclampsia. Patient's questions were answered.    I overall spent approximately 30 minutes in face to face time with the patient, greater than 50% of which was in counseling and care  coordination.      Impression  =========    1. 16w 4d Dichorionic/diamniotic twin gestation  2. Inter Twin EFW discrepancy of 9.2%  3. Twin A:         -fetal biometry c/w dates         -no fetal structural abnormalities appreciated at this early EGA but incomplete assessment         -amniotic fluid volume wnl per qualitative assessment         -posterior placenta and no previa appreciated  4. Twin B:         -fetal biometry c/w dates         -no fetal structural abnormalities appreciated at this early EGA but incomplete assessment         -amniotic fluid volume wnl per qualitative assessment         -Anterior placenta and no previa appreciated  5. Dividing membrane visualized with adequate and about equal amniotic fluid volume on either side  6.  Cervical length screen via transvaginal ultrasound wnl: 4.3cm.    Recommendation  ==============    Di/Di twin gestation:       -We have scheduled patient for ultrasound with [facility] on 18 for fetal anatomical surveys, interval growths, and cervix             -We will schedule patient for subsequent fetal ultrasound assessment monthly for growth until delivery       -Plan for delivery at 38 weeks gestation with normal fetal growth and testing       -Low-dose aspirin daily (81 mg) recommended to start today to reduce the risk of pre-eclampsia      - testing with weekly NSTs beginning at 32 weeks gestation    AMA:  usually recommend a f/u ultrasound at 32 wks gestation for fetal growth: this will be covered by the serial ultrasounds for the twins.

## 2018-05-23 ENCOUNTER — ROUTINE PRENATAL (OUTPATIENT)
Dept: OBSTETRICS AND GYNECOLOGY | Facility: CLINIC | Age: 33
End: 2018-05-23
Payer: COMMERCIAL

## 2018-05-23 VITALS
SYSTOLIC BLOOD PRESSURE: 100 MMHG | WEIGHT: 143.75 LBS | DIASTOLIC BLOOD PRESSURE: 60 MMHG | BODY MASS INDEX: 25.46 KG/M2

## 2018-05-23 DIAGNOSIS — O09.521 AMA (ADVANCED MATERNAL AGE) MULTIGRAVIDA 35+, FIRST TRIMESTER: ICD-10-CM

## 2018-05-23 DIAGNOSIS — O30.041 DICHORIONIC DIAMNIOTIC TWIN PREGNANCY IN FIRST TRIMESTER: ICD-10-CM

## 2018-05-23 DIAGNOSIS — Z3A.17 17 WEEKS GESTATION OF PREGNANCY: Primary | ICD-10-CM

## 2018-05-23 PROBLEM — R11.15 INTRACTABLE CYCLICAL VOMITING WITH NAUSEA: Status: RESOLVED | Noted: 2018-03-05 | Resolved: 2018-05-23

## 2018-05-23 PROCEDURE — 0502F SUBSEQUENT PRENATAL CARE: CPT | Mod: S$GLB,,, | Performed by: OBSTETRICS & GYNECOLOGY

## 2018-05-23 PROCEDURE — 99999 PR PBB SHADOW E&M-EST. PATIENT-LVL II: CPT | Mod: PBBFAC,,, | Performed by: OBSTETRICS & GYNECOLOGY

## 2018-05-23 RX ORDER — ASPIRIN 81 MG/1
81 TABLET ORAL DAILY
Status: ON HOLD | COMMUNITY
End: 2018-09-26 | Stop reason: HOSPADM

## 2018-05-23 NOTE — PROGRESS NOTES
@ 17    1. Di-di twins: f/u with MFM, plan for repeat  at 38 wga and  testing at 32 wga   2. AMA: decline testing, is on ASA   3. Anemia: on iron BID   4. H/o : desires repeat

## 2018-06-13 ENCOUNTER — OFFICE VISIT (OUTPATIENT)
Dept: MATERNAL FETAL MEDICINE | Facility: HOSPITAL | Age: 33
End: 2018-06-13
Attending: OBSTETRICS & GYNECOLOGY
Payer: COMMERCIAL

## 2018-06-13 VITALS — WEIGHT: 146 LBS | DIASTOLIC BLOOD PRESSURE: 60 MMHG | BODY MASS INDEX: 25.86 KG/M2 | SYSTOLIC BLOOD PRESSURE: 112 MMHG

## 2018-06-13 DIAGNOSIS — O09.522 ELDERLY MULTIGRAVIDA IN SECOND TRIMESTER: ICD-10-CM

## 2018-06-13 DIAGNOSIS — Z36.3 ANTENATAL SCREENING FOR MALFORMATION USING ULTRASONICS: ICD-10-CM

## 2018-06-13 DIAGNOSIS — O30.042 DICHORIONIC DIAMNIOTIC TWIN PREGNANCY IN SECOND TRIMESTER: ICD-10-CM

## 2018-06-13 DIAGNOSIS — O30.002 TWIN GESTATION IN SECOND TRIMESTER, UNSPECIFIED MULTIPLE GESTATION TYPE: ICD-10-CM

## 2018-06-13 PROCEDURE — 76812 OB US DETAILED ADDL FETUS: CPT

## 2018-06-13 PROCEDURE — 76811 OB US DETAILED SNGL FETUS: CPT | Mod: 26,,, | Performed by: OBSTETRICS & GYNECOLOGY

## 2018-06-13 PROCEDURE — 76817 TRANSVAGINAL US OBSTETRIC: CPT

## 2018-06-13 PROCEDURE — 76817 TRANSVAGINAL US OBSTETRIC: CPT | Mod: 26,,, | Performed by: OBSTETRICS & GYNECOLOGY

## 2018-06-13 PROCEDURE — 99499 UNLISTED E&M SERVICE: CPT | Mod: ,,, | Performed by: OBSTETRICS & GYNECOLOGY

## 2018-06-13 PROCEDURE — 76811 OB US DETAILED SNGL FETUS: CPT

## 2018-06-13 PROCEDURE — 76812 OB US DETAILED ADDL FETUS: CPT | Mod: 26,,, | Performed by: OBSTETRICS & GYNECOLOGY

## 2018-06-13 PROCEDURE — 76816 OB US FOLLOW-UP PER FETUS: CPT

## 2018-06-13 NOTE — PROGRESS NOTES
Indication  ========    Di/Di twin gestation: ultrasound for fetal anatomical surveys (Dr. Beach).    History  ======    General History  Other: Di/Di twin gestation  AMA  C/S x 1  Previous Outcomes  Preg. no. 1  Outcome: Live birth  Gender: male  Details: C/S   2  Para 1  [facility] living children (L) 1    Pregnancy History  ==============    Maternal Lab Tests  Genetic screening declined.      Maternal Assessment  =================    Weight 66 kg  Weight (lb) 146 lb  BP syst 112 mmHg  BP diast 60 mmHg    Method  ======    Transabdominal ultrasound examination. View: Good view.    Pregnancy  =========    Twin pregnancy. Dichorionic-diamniotic. Number of fetuses: 2.    Dating  ======    LMP on: 2018  Cycle: regular cycle  GA by LMP 18 w + 6 d  MARY by LMP: 2018  Ultrasound examination on: 2018  GA by U/S based upon: AC, BPD, Femur, HC  GA by U/S 21 w + 1 d  MARY by U/S: 10/23/2018  GA by U/S based upon (Fetus 2): AC, BPD, Femur, HC  GA by U/S (Fetus 2) 20 w + 4 d  MARY by U/S (Fetus 2): 10/27/2018  Assigned: Dating performed on 2018, based on ultrasound (Pregnancy 1: CRL)  Assigned GA 20 w + 4 d  Assigned MARY: 10/27/2018    General Evaluation (1)  =================    Cardiac activity: present.  bpm.  Fetal movements: visualized.  Presentation: cephalic left; presenting twin.  Placenta:  Placental site: posterior.  Umbilical cord: Cord vessels: 3 vessel cord. Cord insertion: placental insertion: normal.  Amniotic fluid: normal amount.    Fetal Biometry (1)  ==============    Fetal Biometry  BPD 48.8 mm 20w 5d Hadlock  .0 mm 21w 0d Hadlock  .3 mm 21w 2d Hadlock  Femur 36.6 mm 21w 4d Hadlock  Cerebellum tr 23.2 mm 22w 3d Myers  CM 2.2 mm  Humerus 35.0 mm 22w 0d Teodoro   g 47% Andre  Calculated by: Hadlock (BPD-HC-AC-FL)  EFW (lb) 0 lb  EFW (oz) 15 oz  EFW discordance 12.4 %  HC / AC 1.16  FL / BPD 0.75  FL / AC 0.23   bpm    Fetal Anatomy  (1)  ==============    Cranium: normal  Lateral ventricles: normal  Choroid plexus: normal  Midline falx: normal  Cavum septi pellucidi: normal  Cerebellum: normal  Cisterna magna: normal  Head shape: normal  Rt lateral ventricle: normal  Lt lateral ventricle: normal  Rt choroid plexus: normal  Lt choroid plexus: normal  Parenchyma: normal  Third ventricle: normal  Posterior fossa: normal  Cerebellar lobes: normal  Vermis: normal  Neck: appears normal  Nuchal fold: normal  Lips: suboptimal  Profile: normal  Nose: suboptimal  Maxilla: normal  Mandible: normal  4-chamber view: normal  RVOT: normal  LVOT: normal  Heart / Thorax: Septal views: visualized  Situs: normal  Aortic arch: normal  Ductal arch: suboptimal  SVC: normal  IVC: normal  3-vessel view: suboptimal  3-vessel-trachea view: suboptimal  Cardiac position: normal  Cardiac axis: normal  Cardiac size: normal  Cardiac rhythm: normal  Rt lung: normal  Lt lung: normal  Diaphragm: normal  Cord insertion: normal  Stomach: normal  Kidneys: normal  Bladder: normal  Genitals: normal  Abdom. wall: appears normal  Abdom. cavity: normal  Rt kidney: normal  Lt kidney: normal  Liver: normal  Bowel: normal  Rt renal artery: normal  Lt renal artery: normal  Cervical spine: normal  Thoracic spine: normal  Lumbar spine: normal  Sacral spine: normal  Rt upper arm: normal  Rt forearm: normal  Rt hand: visualized  Rt hand: closed  Lt upper arm: normal  Lt forearm: normal  Lt hand: visualized  Lt hand: closed  Rt upper leg: normal  Rt lower leg: normal  Rt foot: normal  Lt upper leg: normal  Lt lower leg: normal  Lt foot: normal  Position of hands: normal  Position of feet: normal  Gender: female  Wants to know gender: yes    General Evaluation (2)  =================    Cardiac activity: present.  bpm.  Fetal movements: visualized.  Presentation: breech maternal right.  Placenta:  Placental site: anterior, high right.  Umbilical cord: Cord vessels: 3 vessel cord. Cord  insertion: placental insertion: normal.  Amniotic fluid: normal amount.    Fetal Biometry (2)  ==============    Fetal Biometry  BPD 47.5 mm 20w 3d Hadlock  .0 mm 21w 0d Hadlock  .4 mm 21w 1d Hadlock  Femur 31.9 mm 19w 6d Hadlock  Cerebellum tr 20.3 mm 20w 1d Myers  CM 1.4 mm  Humerus 31.9 mm 20w 5d Teodoro   g 28% Andre  Calculated by: Hadlock (BPD-HC-AC-FL)  EFW (lb) 0 lb  EFW (oz) 13 oz  EFW discordance 12.4 %  HC / AC 1.16  FL / BPD 0.67  FL / AC 0.20   bpm    Fetal Anatomy (2)  ==============    Cranium: normal  Lateral ventricles: normal  Choroid plexus: normal  Midline falx: normal  Cavum septi pellucidi: normal  Cerebellum: normal  Cisterna magna: normal  Head shape: normal  Lt lateral ventricle: normal  Rt choroid plexus: normal  Lt choroid plexus: normal  Parenchyma: normal  Third ventricle: normal  Posterior fossa: normal  Cerebellar lobes: normal  Vermis: normal  Neck: suboptimal  Nuchal fold: normal  Lips: normal  Profile: normal  Nose: normal  Maxilla: normal  Mandible: normal  4-chamber view: normal  RVOT: normal  LVOT: normal  Heart / Thorax: Septal views: sub opt  Situs: normal  Aortic arch: suboptimal  Ductal arch: normal  SVC: normal  IVC: normal  3-vessel view: suboptimal  3-vessel-trachea view: suboptimal  Cardiac position: normal  Cardiac axis: normal  Cardiac size: normal  Cardiac rhythm: normal  Rt lung: normal  Lt lung: normal  Diaphragm: suboptimal  Cord insertion: normal  Stomach: normal  Kidneys: normal  Bladder: normal  Genitals: normal  Abdom. wall: appears normal  Abdom. cavity: normal  Rt kidney: normal  Lt kidney: normal  Liver: normal  Bowel: normal  Rt renal artery: normal  Lt renal artery: normal  Cervical spine: suboptimal  Thoracic spine: suboptimal  Lumbar spine: suboptimal  Sacral spine: suboptimal  Rt upper arm: normal  Rt forearm: normal  Rt hand: visualized  Rt hand: open  Lt upper arm: normal  Lt forearm: normal  Lt hand: visualized  Lt  "hand: open  Rt upper leg: normal  Rt lower leg: normal  Rt foot: normal  Lt upper leg: normal  Lt lower leg: normal  Lt foot: normal  Position of hands: normal  Position of feet: normal  Gender: female  Wants to know gender: yes    Maternal Structures  ===============    Uterus / Cervix  Uterus: Normal  Cervix: Normal  Approach: Transvaginal  Cervical length 41.2 mm  Ovaries / Tubes / Adnexa  Rt ovary: removed  Lt ovary details: previously visualized  Pouch of Adán / Other Structures  Cul de Sac: Visualized  Free fluid: No free fluid visualized  Other: Uterus and adnexa normal    Consultation  ==========    FUV: ultrasound for fetal anatomical surveys, interval growths    initial referral was for Di/Di twin gestation    referral from Dr. Beach    33 y.o.  at 20w 4d gestation (Di/Di twin gestation)    AMA:  declined genetic screening or amniocentesis    Ob hx:  , term, 7 lbs 8oz, C/S - "failure to progress"    Social hx:  THC stopped with pregnancy    ultrasound performed    Patient counseled on today's ultrasound evaluation and recommendations. Her questions were answered.      Impression  =========    1. 20w 4d dichorionic diamniotic twin gestation  2. Interval fetal growths wnl and concordant with inter-twin EFW discrepancy of 12.4%  3. Twin A:         -no fetal structural abnormalities appreciated but incomplete evaluation secondary to fetal position         -normal fetal growth (EFW = 417 gms at 47%)         -amniotic fluid volume wnl per qualitative assessment         -posterior placenta and no previa appreciated  4. Twin B:         -no fetal structural abnormalities appreciated but incomplete evaluation secondary to fetal position         -normal fetal growth (EFW = 365 gms at 23%)         -amniotic fluid volume wnl per qualitative assessment         -anterior placenta and no previa appreciated  5. Dividing membrane visualized with adequate and about equal amniotic fluid volume on either side  6. " Cervical length screen via transvaginal ultrasound is wnl .    Recommendation  ==============    Di/Di twin gestation:     -We have scheduled patient for ultrasound with [facility] on 18 for f/u fetal anatomical surveys and interval growths     -We will schedule patient for subsequent fetal ultrasound assessment monthly for growth until delivery     -Plan for delivery at 38 weeks gestation with normal fetal growth and testing     -Continue low-dose aspirin daily (81 mg) to reduce the risk of pre-eclampsia     - testing with weekly NSTs beginning at 32 weeks gestation    AMA:  usually recommend a f/u ultrasound at 32 wks gestation for fetal growth: this will be covered by the serial ultrasounds for the twins.

## 2018-06-13 NOTE — LETTER
June 13, 2018      Agata Beach MD  [address]  [place] [address]           [facility]  [address] [place] [address]  Phone: [phone]          Patient: Loulou Gann   MR Number: [id number]   YOB: 1985   Date of Visit: 6/13/2018       Dear Dr. Agata Beach:    Thank you for referring Loulou Gann to me for evaluation. Attached you will find relevant portions of my assessment and plan of care.    If you have questions, please do not hesitate to call me. I look forward to following Loulou Gann along with you.    Sincerely,    Makayla Márquez MD    Enclosure  CC:  No Recipients    If you would like to receive this communication electronically, please contact [email or web address] or [phone] to request more information on [employer] Link access.    For providers and/or their staff who would like to refer a patient to [facility], please contact us through our one-stop-shop provider referral line, Kannan Avila, at [phone].    If you feel you have received this communication in error or would no longer like to receive these types of communications, please e-mail [email or web address]

## 2018-06-20 ENCOUNTER — ROUTINE PRENATAL (OUTPATIENT)
Dept: OBSTETRICS AND GYNECOLOGY | Facility: CLINIC | Age: 33
End: 2018-06-20
Payer: COMMERCIAL

## 2018-06-20 VITALS
DIASTOLIC BLOOD PRESSURE: 58 MMHG | BODY MASS INDEX: 26.39 KG/M2 | SYSTOLIC BLOOD PRESSURE: 102 MMHG | HEART RATE: 96 BPM | WEIGHT: 149 LBS

## 2018-06-20 DIAGNOSIS — O09.521 AMA (ADVANCED MATERNAL AGE) MULTIGRAVIDA 35+, FIRST TRIMESTER: ICD-10-CM

## 2018-06-20 DIAGNOSIS — O30.041 DICHORIONIC DIAMNIOTIC TWIN PREGNANCY IN FIRST TRIMESTER: ICD-10-CM

## 2018-06-20 DIAGNOSIS — Z3A.21 21 WEEKS GESTATION OF PREGNANCY: Primary | ICD-10-CM

## 2018-06-20 PROCEDURE — 0502F SUBSEQUENT PRENATAL CARE: CPT | Mod: S$GLB,,, | Performed by: OBSTETRICS & GYNECOLOGY

## 2018-06-20 PROCEDURE — 99999 PR PBB SHADOW E&M-EST. PATIENT-LVL II: CPT | Mod: PBBFAC,,, | Performed by: OBSTETRICS & GYNECOLOGY

## 2018-06-20 NOTE — PROGRESS NOTES
@ 21    1. Di-di twins: f/u with MFM for growth US, plan for repeat  at 38 wga and  testing at 32 wga   2. AMA: decline testing, is on ASA   3. Anemia: on iron BID   4. H/o : desires repeat at 38 wga  5. DM test next visit

## 2018-07-09 ENCOUNTER — OFFICE VISIT (OUTPATIENT)
Dept: MATERNAL FETAL MEDICINE | Facility: HOSPITAL | Age: 33
End: 2018-07-09
Attending: OBSTETRICS & GYNECOLOGY
Payer: COMMERCIAL

## 2018-07-09 VITALS — BODY MASS INDEX: 27.99 KG/M2 | DIASTOLIC BLOOD PRESSURE: 70 MMHG | SYSTOLIC BLOOD PRESSURE: 110 MMHG | WEIGHT: 158 LBS

## 2018-07-09 DIAGNOSIS — Z36.89 ENCOUNTER FOR ULTRASOUND TO CHECK FETAL GROWTH: ICD-10-CM

## 2018-07-09 DIAGNOSIS — O09.522 AMA (ADVANCED MATERNAL AGE) MULTIGRAVIDA 35+, SECOND TRIMESTER: ICD-10-CM

## 2018-07-09 DIAGNOSIS — O30.002 TWIN GESTATION IN SECOND TRIMESTER, UNSPECIFIED MULTIPLE GESTATION TYPE: ICD-10-CM

## 2018-07-09 PROCEDURE — 99499 UNLISTED E&M SERVICE: CPT | Mod: ,,, | Performed by: OBSTETRICS & GYNECOLOGY

## 2018-07-09 PROCEDURE — 76816 OB US FOLLOW-UP PER FETUS: CPT | Mod: 26,,, | Performed by: OBSTETRICS & GYNECOLOGY

## 2018-07-09 PROCEDURE — 76816 OB US FOLLOW-UP PER FETUS: CPT

## 2018-07-09 NOTE — LETTER
July 9, 2018      Agata Beach MD  [address]  [place] [address]           [facility]  [address] [place] [address]  Phone: [phone]          Patient: Loulou Gann   MR Number: [id number]   YOB: 1985   Date of Visit: 7/9/2018       Dear Dr. Agata Beach:    Thank you for referring Loulou Gann to me for evaluation. Attached you will find relevant portions of my assessment and plan of care.    If you have questions, please do not hesitate to call me. I look forward to following Loulou Gann along with you.    Sincerely,    David Braden MD    Enclosure  CC:  No Recipients    If you would like to receive this communication electronically, please contact [email or web address] or [phone] to request more information on [employer] Link access.    For providers and/or their staff who would like to refer a patient to [facility], please contact us through our one-stop-shop provider referral line, Kannan Avila, at [phone].    If you feel you have received this communication in error or would no longer like to receive these types of communications, please e-mail [email or web address]

## 2018-07-10 NOTE — PROGRESS NOTES
Indication  ========    Twin, Evaluation of fetal growth.    History  ======    General History  Other: Di/Di twin gestation  AMA  C/S x 1  Previous Outcomes  Preg. no. 1  Outcome: Live birth  Gender: male  Details: C/S   2  Para 1  [place] living children (L) 1    Pregnancy History  ==============    Maternal Lab Tests  Genetic screening declined.      Maternal Assessment  =================    Weight 68 kg  Weight (lb) 150 lb  BP syst 110 mmHg  BP diast 70 mmHg    Method  ======    Transabdominal ultrasound examination. View: Good view.    Pregnancy  =========    Twin pregnancy. Dichorionic-diamniotic. Number of fetuses: 2.    Dating  ======    LMP on: 2018  Cycle: regular cycle  GA by LMP 22 w + 4 d  MARY by LMP: 2018  Ultrasound examination on: 2018  GA by U/S based upon: AC, BPD, Femur, HC  GA by U/S 25 w + 0 d  MARY by U/S: 10/22/2018  GA by U/S based upon (Fetus 2): AC, BPD, Femur, HC  GA by U/S (Fetus 2) 23 w + 5 d  MARY by U/S (Fetus 2): 10/31/2018  Assigned: Dating performed on 2018, based on ultrasound (Pregnancy 1: CRL)  Assigned GA 24 w + 2 d  Assigned MARY: 10/27/2018    General Evaluation (1)  =================    Cardiac activity: present.  bpm.  Fetal movements: visualized.  Presentation: breech left, presenting twin.  Placenta:  Placental site: posterior, left.  Umbilical cord: Cord vessels: 3 vessel cord.  Amniotic fluid: Amount of AF: normal amount. MVP 7.0 cm.    Fetal Biometry (1)  ==============    Fetal Biometry  BPD 59.1 mm 24w 1d Hadlock  .2 mm 25w 0d Hadlock  .6 mm 24w 6d Hadlock  Femur 47.9 mm 26w 0d Hadlock   g 64% Andre  Calculated by: Hadlock (BPD-HC-AC-FL)  EFW (lb) 1 lb  EFW (oz) 12 oz  EFW discordance 23.2 %  HC / AC 1.14  FL / BPD 0.81  FL / AC 0.24  MVP 7.0 cm   bpm    Fetal Anatomy (1)  ==============    Cranium: normal  Midline falx: normal  Posterior fossa: normal  Lips: normal  Nose: normal  4-chamber  view: normal  RVOT: normal  LVOT: normal  Ductal arch: suboptimal  3-vessel view: normal  3-vessel-trachea view: normal  Stomach: normal  Kidneys: normal  Bladder: normal  Gender: female  Wants to know gender: yes  Other: A full anatomy survey previously performed.    General Evaluation (2)  =================    Cardiac activity: present.  bpm.  Fetal movements: visualized.  Presentation: cephalic right.  Placenta:  Placental site: anterior.  Umbilical cord: Cord vessels: 3 vessel cord.  Amniotic fluid: Amount of AF: normal amount. MVP 5.6 cm.    Fetal Biometry (2)  ==============    Fetal Biometry  BPD 57.4 mm 23w 4d Hadlock  .6 mm 24w 3d Hadlock  .3 mm 23w 4d Hadlock  Femur 41.1 mm 23w 2d Hadlock   g 26% Andre  Calculated by: Hadlock (BPD-HC-AC-FL)  EFW (lb) 1 lb  EFW (oz) 5 oz  EFW discordance 23.2 %  HC / AC 1.19  FL / BPD 0.72  FL / AC 0.22  MVP 5.6 cm   bpm    Fetal Anatomy (2)  ==============    Cranium: normal  Posterior fossa: normal  4-chamber view: suboptimal  RVOT: normal  LVOT: normal  Heart / Thorax: septum visualized  Aortic arch: normal  3-vessel view: normal  3-vessel-trachea view: suboptimal  Diaphragm: normal  Stomach: normal  Kidneys: normal  Bladder: normal  Cervical spine: normal  Thoracic spine: normal  Lumbar spine: normal  Sacral spine: normal  Gender: female  Wants to know gender: yes    Impression  =========    The fetal anatomic survey was completed today, and no fetal structural abnormalities were noted in either twin although some views are still  suboptimal. Interval fetal growth has been normal in each twin at the 64th (Previously at 47th) and 26th (Previously at 23rd) percentile but the  degree of discordance has increased to 23% (from 13%). The AFV is normal.  Since growth of the fetuses is normal this is not a pathologic finding, but warrants follow-up growth in 3-4 weeks.    Recommendation  ==============    We recommend repeat evaluation for  fetal growth assessment in 3-4 weeks.

## 2018-07-18 ENCOUNTER — LAB VISIT (OUTPATIENT)
Dept: LAB | Facility: HOSPITAL | Age: 33
End: 2018-07-18
Attending: OBSTETRICS & GYNECOLOGY
Payer: COMMERCIAL

## 2018-07-18 ENCOUNTER — PATIENT MESSAGE (OUTPATIENT)
Dept: OBSTETRICS AND GYNECOLOGY | Facility: CLINIC | Age: 33
End: 2018-07-18

## 2018-07-18 ENCOUNTER — ROUTINE PRENATAL (OUTPATIENT)
Dept: OBSTETRICS AND GYNECOLOGY | Facility: CLINIC | Age: 33
End: 2018-07-18
Payer: COMMERCIAL

## 2018-07-18 VITALS
BODY MASS INDEX: 29.33 KG/M2 | WEIGHT: 165.56 LBS | SYSTOLIC BLOOD PRESSURE: 105 MMHG | DIASTOLIC BLOOD PRESSURE: 67 MMHG

## 2018-07-18 DIAGNOSIS — Z3A.21 21 WEEKS GESTATION OF PREGNANCY: ICD-10-CM

## 2018-07-18 DIAGNOSIS — Z3A.25 25 WEEKS GESTATION OF PREGNANCY: Primary | ICD-10-CM

## 2018-07-18 DIAGNOSIS — O30.041 DICHORIONIC DIAMNIOTIC TWIN PREGNANCY IN FIRST TRIMESTER: ICD-10-CM

## 2018-07-18 DIAGNOSIS — O09.521 AMA (ADVANCED MATERNAL AGE) MULTIGRAVIDA 35+, FIRST TRIMESTER: ICD-10-CM

## 2018-07-18 LAB — GLUCOSE SERPL-MCNC: 89 MG/DL

## 2018-07-18 PROCEDURE — 99999 PR PBB SHADOW E&M-EST. PATIENT-LVL II: CPT | Mod: PBBFAC,,, | Performed by: OBSTETRICS & GYNECOLOGY

## 2018-07-18 PROCEDURE — 82950 GLUCOSE TEST: CPT

## 2018-07-18 PROCEDURE — 36415 COLL VENOUS BLD VENIPUNCTURE: CPT

## 2018-07-18 PROCEDURE — 0502F SUBSEQUENT PRENATAL CARE: CPT | Mod: S$GLB,,, | Performed by: OBSTETRICS & GYNECOLOGY

## 2018-07-18 NOTE — PROGRESS NOTES
@ 25    1. Di-di twins: f/u with MFM for growth US, plan for repeat  at 38 wga and  testing at 32 wga   2. AMA: decline testing, is on ASA   3. Anemia: on iron BID   4. H/o : desires repeat at 38 wga  5. DM test today  No complaints

## 2018-07-30 ENCOUNTER — PATIENT MESSAGE (OUTPATIENT)
Dept: OBSTETRICS AND GYNECOLOGY | Facility: CLINIC | Age: 33
End: 2018-07-30

## 2018-08-02 ENCOUNTER — TELEPHONE (OUTPATIENT)
Dept: PEDIATRIC CARDIOLOGY | Facility: CLINIC | Age: 33
End: 2018-08-02

## 2018-08-02 ENCOUNTER — OFFICE VISIT (OUTPATIENT)
Dept: MATERNAL FETAL MEDICINE | Facility: HOSPITAL | Age: 33
End: 2018-08-02
Attending: OBSTETRICS & GYNECOLOGY
Payer: COMMERCIAL

## 2018-08-02 VITALS — DIASTOLIC BLOOD PRESSURE: 80 MMHG | SYSTOLIC BLOOD PRESSURE: 118 MMHG | BODY MASS INDEX: 30.11 KG/M2 | WEIGHT: 170 LBS

## 2018-08-02 DIAGNOSIS — O30.042 DICHORIONIC DIAMNIOTIC TWIN PREGNANCY IN SECOND TRIMESTER: ICD-10-CM

## 2018-08-02 DIAGNOSIS — O09.522 ELDERLY MULTIGRAVIDA IN SECOND TRIMESTER: ICD-10-CM

## 2018-08-02 DIAGNOSIS — O30.002 TWIN GESTATION IN SECOND TRIMESTER, UNSPECIFIED MULTIPLE GESTATION TYPE: ICD-10-CM

## 2018-08-02 DIAGNOSIS — Z36.4 ANTENATAL SCREENING FOR FETAL GROWTH RETARDATION USING ULTRASONICS: ICD-10-CM

## 2018-08-02 DIAGNOSIS — O09.522 AMA (ADVANCED MATERNAL AGE) MULTIGRAVIDA 35+, SECOND TRIMESTER: ICD-10-CM

## 2018-08-02 PROCEDURE — 76816 OB US FOLLOW-UP PER FETUS: CPT | Mod: 26,59,, | Performed by: OBSTETRICS & GYNECOLOGY

## 2018-08-02 PROCEDURE — 76816 OB US FOLLOW-UP PER FETUS: CPT | Mod: 59

## 2018-08-02 PROCEDURE — 99499 UNLISTED E&M SERVICE: CPT | Mod: ,,, | Performed by: OBSTETRICS & GYNECOLOGY

## 2018-08-02 NOTE — TELEPHONE ENCOUNTER
Spoke with patient via telephone. Fetal echo scheduled for 9/6/18 @ 1 pm at [facility]. Office number and address verified. appt slips mailed out.

## 2018-08-02 NOTE — LETTER
August 2, 2018      Agata Beach MD  [address]  [place] [address]           [facility]  [address] [place] [address]  Phone: [phone]          Patient: Loulou Gann   MR Number: [id number]   YOB: 1985   Date of Visit: 8/2/2018       Dear Dr. Agata Beach:    Thank you for referring Loulou Gann to me for evaluation. Attached you will find relevant portions of my assessment and plan of care.    If you have questions, please do not hesitate to call me. I look forward to following Loulou Gann along with you.    Sincerely,    Makayla Márquez MD    Enclosure  CC:  No Recipients    If you would like to receive this communication electronically, please contact [email or web address] or [phone] to request more information on [facility] [facility] access.    For providers and/or their staff who would like to refer a patient to [facility], please contact us through our one-stop-shop provider referral line, Kannan Avila, at [phone].    If you feel you have received this communication in error or would no longer like to receive these types of communications, please e-mail [email or web address]

## 2018-08-02 NOTE — PROGRESS NOTES
Indication  ========    Di/Di twin gestation: ultrasound for interval fetal growths, f/u fetal anatomical surveys, and amniotic fluid volumes (Dr. Beach).    History  ======    General History  Other: Di/Di twin gestation  AMA  C/S x 1  Previous Outcomes  Preg. no. 1  Outcome: Live birth  Gender: male  Details: C/S   2  Para 1  [place] living children (L) 1    Pregnancy History  ==============    Maternal Lab Tests  Genetic screening declined.      Maternal Assessment  =================    Weight 77 kg  Weight (lb) 170 lb  BP syst 118 mmHg  BP diast 80 mmHg    Method  ======    Transabdominal ultrasound examination. View: Good view.    Pregnancy  =========    Twin pregnancy. Dichorionic-diamniotic. Number of fetuses: 2.    Dating  ======    LMP on: 2018  Cycle: regular cycle  GA by LMP 26 w + 0 d  MARY by LMP: 2018  Ultrasound examination on: 2018  GA by U/S based upon: AC, BPD, Femur, HC  GA by U/S 29 w + 2 d  MARY by U/S: 10/16/2018  GA by U/S based upon (Fetus 2): AC, BPD, Femur, HC  GA by U/S (Fetus 2) 27 w + 2 d  MARY by U/S (Fetus 2): 10/30/2018  Assigned: Dating performed on 2018, based on ultrasound (Pregnancy 1: CRL)  Assigned GA 27 w + 5 d  Assigned MARY: 10/27/2018    General Evaluation (1)  =================    Cardiac activity: present.  bpm.  Fetal movements: visualized.  Presentation: cephalic left.  Placenta:  Placental site: posterior.  Umbilical cord: Cord vessels: 3 vessel cord; placental cord insertion prev seen wnl.  Amniotic fluid: Amount of AF: normal amount. MVP 5.6 cm.    Fetal Biometry (1)  ==============    Fetal Biometry  BPD 73.2 mm 29w 3d Hadlock  .0 mm 29w 4d Hadlock  .6 mm 29w 0d Hadlock  Femur 55.8 mm 29w 3d Hadlock  EFW 1,352 g 64% Andre  Calculated by: Hadlock (BPD-HC-AC-FL)  EFW (lb) 3 lb  EFW (oz) 0 oz  EFW discordance 27.0 %  HC / AC 1.09  FL / BPD 0.76  FL / AC 0.23  MVP 5.6 cm   bpm    Fetal Anatomy  "(1)  ==============    Cranium: normal  Posterior fossa: documented previously  4-chamber view: normal  Cord insertion: normal  Stomach: normal  Kidneys: normal  Bladder: normal  Gender: female  Wants to know gender: yes    General Evaluation (2)  =================    Cardiac activity: present.  bpm.  Fetal movements: visualized.  Presentation: oblique, head maternal RLQ.  Placenta:  Placental site: anterior.  Umbilical cord: Cord vessels: 3 vessel cord.  Amniotic fluid: Amount of AF: normal amount. MVP 4.4 cm.    Fetal Biometry (2)  ==============    Fetal Biometry  BPD 68.6 mm 27w 4d Hadlock  .7 mm 28w 4d Hadlock  .9 mm 26w 5d Hadlock  Femur 48.7 mm 26w 3d Hadlock   g 29% Andre  Calculated by: Hadlock (BPD-HC-AC-FL)  EFW (lb) 2 lb  EFW (oz) 3 oz  EFW discordance 27.0 %  HC / AC 1.18  FL / BPD 0.71  FL / AC 0.22  MVP 4.4 cm   bpm    Fetal Anatomy (2)  ==============    Cranium: normal  Posterior fossa: documented previously  4-chamber view: suboptimal  Heart / Thorax: septum sub opt  3-vessel-trachea view: suboptimal  Stomach: normal  Kidneys: normal  Bladder: normal  Gender: female  Wants to know gender: yes    Consultation  ==========    FUV: ultrasound for fetal anatomical surveys, interval growths    initial referral was for Di/Di twin gestation    referral from Dr. Beach    33 y.o.  at 27w 5d gestation (Di/Di twin gestation)    glucose screen 89    Dichorionic/diamniotic twins    AMA:  declined genetic screening or amniocentesis    Ob hx:  2006, term, 7 lbs 8oz, C/S - "failure to progress"    Social hx:  THC stopped with pregnancy    ultrasound performed    Patient counseled on today's ultrasound evaluation and recommendations. Her questions were answered.      Impression  =========    1. 27w 5d dichorionic diamniotic twin gestation  2. Discordant interval fetal growth with inter twin EFW discrepancy of 27% but both fetuses EFW are wnl:          Twin A:             " EFW = 1352 gms at 64%             limited f/u anatomy: no abnormalities appreciated for those organs evaluated             amniotic fluid volume wnl (MVP 5.6cm)          Twin B:             EFW = 987 gms at 29%             limited f/u anatomy: no abnormalities appreciated for those organs evaluated but remains incomplete for cardiac             amniotic fluid volume wnl (MVP 4.4cm)  3. Dividing membrane visualized with adequate amniotic fluid volume in each sac .    Recommendation  ==============    Di/Di twin gestation:  -We have scheduled patient for ultrasound with MFM on 18 for interval growths/amniotic fluid volume assessments  -We will schedule patient for subsequent fetal ultrasound assessment about every 3-4 wks until delivery  -We will refer patient to Peds Cards for fetal echocardiograms secondary to Twin B cardiac evaluation remains incomplete         after 3 MFM evaluations  -Plan for delivery at 38 weeks gestation with normal fetal growth and testing  -Continue low-dose aspirin daily (81 mg) to reduce the risk of pre-eclampsia  - testing weekly starting at 32 wks gestation (BPP or NST/MVP weekly)    AMA:  usually recommend a f/u ultrasound at 32 wks gestation for fetal growth: this will be covered by the serial ultrasounds for the twins.

## 2018-08-09 DIAGNOSIS — O30.001 TWIN GESTATION IN FIRST TRIMESTER, UNSPECIFIED MULTIPLE GESTATION TYPE: Primary | ICD-10-CM

## 2018-08-14 PROBLEM — O30.043 DICHORIONIC DIAMNIOTIC TWIN PREGNANCY IN THIRD TRIMESTER: Status: ACTIVE | Noted: 2018-03-14

## 2018-08-14 PROBLEM — O30.041 DICHORIONIC DIAMNIOTIC TWIN PREGNANCY IN FIRST TRIMESTER: Status: RESOLVED | Noted: 2018-03-14 | Resolved: 2018-08-14

## 2018-08-15 ENCOUNTER — ROUTINE PRENATAL (OUTPATIENT)
Dept: OBSTETRICS AND GYNECOLOGY | Facility: CLINIC | Age: 33
End: 2018-08-15
Payer: COMMERCIAL

## 2018-08-15 VITALS — DIASTOLIC BLOOD PRESSURE: 68 MMHG | WEIGHT: 174 LBS | BODY MASS INDEX: 30.82 KG/M2 | SYSTOLIC BLOOD PRESSURE: 120 MMHG

## 2018-08-15 DIAGNOSIS — Z3A.29 29 WEEKS GESTATION OF PREGNANCY: Primary | ICD-10-CM

## 2018-08-15 DIAGNOSIS — N89.8 VAGINAL DISCHARGE: ICD-10-CM

## 2018-08-15 DIAGNOSIS — O09.521 AMA (ADVANCED MATERNAL AGE) MULTIGRAVIDA 35+, FIRST TRIMESTER: ICD-10-CM

## 2018-08-15 DIAGNOSIS — O30.043 DICHORIONIC DIAMNIOTIC TWIN PREGNANCY IN THIRD TRIMESTER: ICD-10-CM

## 2018-08-15 PROCEDURE — 99999 PR PBB SHADOW E&M-EST. PATIENT-LVL II: CPT | Mod: PBBFAC,,, | Performed by: OBSTETRICS & GYNECOLOGY

## 2018-08-15 PROCEDURE — 87660 TRICHOMONAS VAGIN DIR PROBE: CPT

## 2018-08-15 PROCEDURE — 0502F SUBSEQUENT PRENATAL CARE: CPT | Mod: S$GLB,,, | Performed by: OBSTETRICS & GYNECOLOGY

## 2018-08-15 NOTE — PROGRESS NOTES
@ 29    1. Di-di twins: f/u with MFM for growth US, plan for repeat  at 38 wga and  testing at 32 wga   2. AMA: decline testing, is on ASA; has appt with MFM next week, will see back in 3 weeks and start BPPs then   3. Anemia: on iron BID   4. H/o : desires repeat at 38 wga  5. DM test normal, will order 3rd trim labs; will do tdap next visit   6. For the past 2 weeks noticed that her underwear seems damp, no odor or big gush of fluid. She is worried it could be amniotic fluid. No contractions. It will happen once a day. She is unsure if it could be urine or sweat as well. No pooling on speculum, nitrazine negative and no ferning seen. MVP around both is 4.5/3.6 . Discussed precautions and when to come to hospital.   7. Consents and btl consents signed today

## 2018-08-17 ENCOUNTER — PATIENT MESSAGE (OUTPATIENT)
Dept: OBSTETRICS AND GYNECOLOGY | Facility: CLINIC | Age: 33
End: 2018-08-17

## 2018-08-23 ENCOUNTER — OFFICE VISIT (OUTPATIENT)
Dept: MATERNAL FETAL MEDICINE | Facility: HOSPITAL | Age: 33
End: 2018-08-23
Attending: OBSTETRICS & GYNECOLOGY
Payer: COMMERCIAL

## 2018-08-23 VITALS — DIASTOLIC BLOOD PRESSURE: 74 MMHG | WEIGHT: 176 LBS | BODY MASS INDEX: 31.18 KG/M2 | SYSTOLIC BLOOD PRESSURE: 134 MMHG

## 2018-08-23 DIAGNOSIS — O30.001 TWIN GESTATION IN FIRST TRIMESTER, UNSPECIFIED MULTIPLE GESTATION TYPE: ICD-10-CM

## 2018-08-23 PROCEDURE — 76816 OB US FOLLOW-UP PER FETUS: CPT | Mod: 26,,, | Performed by: OBSTETRICS & GYNECOLOGY

## 2018-08-23 PROCEDURE — 76816 OB US FOLLOW-UP PER FETUS: CPT | Mod: 59

## 2018-08-23 PROCEDURE — 3008F BODY MASS INDEX DOCD: CPT | Mod: CPTII,,, | Performed by: OBSTETRICS & GYNECOLOGY

## 2018-08-23 PROCEDURE — 76819 FETAL BIOPHYS PROFIL W/O NST: CPT | Mod: 26,59,, | Performed by: OBSTETRICS & GYNECOLOGY

## 2018-08-23 PROCEDURE — 99213 OFFICE O/P EST LOW 20 MIN: CPT | Mod: 25,,, | Performed by: OBSTETRICS & GYNECOLOGY

## 2018-08-23 NOTE — PROGRESS NOTES
FUV: Dr. Beach; AMA; Discordant DC/DA Twins    33  MARY 10/27/18; 30w5d    Prenatal record, chart and OB History reviewed  Maternal serum screening - declined  Dr. Márquez's last note reviewed - on  (A) 64% & (B) 29% = 27% discordant  Pt interviewed and examined  Ultrasound performed  Interval problems - pelvic pressure; left sciatica  No leaking, bleeding or discharge  Good FM    OB HX  2006 - [place]; 7-8; C/S at term.    Impression  =========  Discordant DC/DA Twins with adequate interval growth  (A) 1969 grams 56th% & (B) 1389 grams 17th% = 30% discordance  Reassuring fetal surveillance and amniotic fluid volume on each twin.    Recommendation  ==============  Begin weekly BPP or NST/MVP at 32 weeks  With your permission, we will re-evaluate fetal growth, well being and maternal health in 4 weeks.

## 2018-08-28 DIAGNOSIS — O30.043 DICHORIONIC DIAMNIOTIC TWIN PREGNANCY IN THIRD TRIMESTER: Primary | ICD-10-CM

## 2018-08-28 DIAGNOSIS — O09.529 ANTEPARTUM MULTIGRAVIDA OF ADVANCED MATERNAL AGE: ICD-10-CM

## 2018-09-05 ENCOUNTER — PATIENT MESSAGE (OUTPATIENT)
Dept: OBSTETRICS AND GYNECOLOGY | Facility: CLINIC | Age: 33
End: 2018-09-05

## 2018-09-05 ENCOUNTER — PROCEDURE VISIT (OUTPATIENT)
Dept: OBSTETRICS AND GYNECOLOGY | Facility: CLINIC | Age: 33
End: 2018-09-05
Payer: COMMERCIAL

## 2018-09-05 ENCOUNTER — HOSPITAL ENCOUNTER (OUTPATIENT)
Facility: HOSPITAL | Age: 33
Discharge: HOME | End: 2018-09-05
Attending: OBSTETRICS & GYNECOLOGY | Admitting: OBSTETRICS & GYNECOLOGY
Payer: COMMERCIAL

## 2018-09-05 ENCOUNTER — ROUTINE PRENATAL (OUTPATIENT)
Dept: OBSTETRICS AND GYNECOLOGY | Facility: CLINIC | Age: 33
End: 2018-09-05
Payer: COMMERCIAL

## 2018-09-05 ENCOUNTER — CLINICAL SUPPORT (OUTPATIENT)
Dept: OBSTETRICS AND GYNECOLOGY | Facility: CLINIC | Age: 33
End: 2018-09-05
Payer: COMMERCIAL

## 2018-09-05 VITALS — DIASTOLIC BLOOD PRESSURE: 62 MMHG | BODY MASS INDEX: 31.71 KG/M2 | SYSTOLIC BLOOD PRESSURE: 116 MMHG | WEIGHT: 179 LBS

## 2018-09-05 VITALS — SYSTOLIC BLOOD PRESSURE: 124 MMHG | TEMPERATURE: 98 F | HEART RATE: 96 BPM | DIASTOLIC BLOOD PRESSURE: 78 MMHG

## 2018-09-05 DIAGNOSIS — O30.009: Primary | ICD-10-CM

## 2018-09-05 DIAGNOSIS — O09.521 AMA (ADVANCED MATERNAL AGE) MULTIGRAVIDA 35+, FIRST TRIMESTER: ICD-10-CM

## 2018-09-05 DIAGNOSIS — O09.523 AMA (ADVANCED MATERNAL AGE) MULTIGRAVIDA 35+, THIRD TRIMESTER: ICD-10-CM

## 2018-09-05 DIAGNOSIS — O36.5990: Primary | ICD-10-CM

## 2018-09-05 DIAGNOSIS — Z3A.29 29 WEEKS GESTATION OF PREGNANCY: ICD-10-CM

## 2018-09-05 DIAGNOSIS — Z30.09 UNWANTED FERTILITY: ICD-10-CM

## 2018-09-05 DIAGNOSIS — Z23 NEED FOR TDAP VACCINATION: Primary | ICD-10-CM

## 2018-09-05 DIAGNOSIS — Z36.89 NON-STRESS TEST REACTIVE ON FETAL SURVEILLANCE: ICD-10-CM

## 2018-09-05 DIAGNOSIS — O30.043 DICHORIONIC DIAMNIOTIC TWIN PREGNANCY IN THIRD TRIMESTER: ICD-10-CM

## 2018-09-05 DIAGNOSIS — Z3A.32 32 WEEKS GESTATION OF PREGNANCY: Primary | ICD-10-CM

## 2018-09-05 PROCEDURE — 90471 IMMUNIZATION ADMIN: CPT | Mod: S$GLB,,, | Performed by: OBSTETRICS & GYNECOLOGY

## 2018-09-05 PROCEDURE — 90715 TDAP VACCINE 7 YRS/> IM: CPT | Mod: S$GLB,,, | Performed by: OBSTETRICS & GYNECOLOGY

## 2018-09-05 PROCEDURE — 99211 OFF/OP EST MAY X REQ PHY/QHP: CPT | Mod: 25

## 2018-09-05 PROCEDURE — 99999 PR PBB SHADOW E&M-EST. PATIENT-LVL II: CPT | Mod: PBBFAC,,, | Performed by: OBSTETRICS & GYNECOLOGY

## 2018-09-05 PROCEDURE — 0502F SUBSEQUENT PRENATAL CARE: CPT | Mod: S$GLB,,, | Performed by: OBSTETRICS & GYNECOLOGY

## 2018-09-05 PROCEDURE — 59025 FETAL NON-STRESS TEST: CPT

## 2018-09-05 PROCEDURE — 76819 FETAL BIOPHYS PROFIL W/O NST: CPT | Mod: S$GLB,,, | Performed by: OBSTETRICS & GYNECOLOGY

## 2018-09-05 NOTE — PROGRESS NOTES
9/5/18 at 1500 administered 0.5 ml of Adacel (Tdap) to L deltoid.   Pt tolerated well.   Pt advised to wait 15 minutes before leaving the office.   Pt verbalized understanding.   Pt received VIS.  Lot: [id number]  Exp: 2/6/20  Man: [employer]

## 2018-09-05 NOTE — PLAN OF CARE
1600  at 32 4/7 with di/di twin gestation sent from clinic for NST.  MD called in orders prior to patient arrival.  EFM applied, patient educated on POC.      1630  Dr. Beach alerted of fetal tracing.  MD reviewed EFM strip.  Orders received for DC and patient to keep Ped/Cardi apt tomorrow.

## 2018-09-05 NOTE — PROGRESS NOTES
@ 32    1. Di-di twins: f/u with MFM for growth US, plan for repeat  at 38 wga and  testing at 32 wga   -BPP:  on both today but  membrane seen. Discussed with with MFM Selvin who said that is likely not of consequence and wouldn't  she also said since neither twin is IUGR she would not be concerned about the S/D ratio. Will send to L&D for monitoring for possible variable decelerations.   2. AMA: decline testing, is on ASA; has appt with MFM in 2 weeks for growth    3. Anemia: on iron BID   4. H/o : desires repeat at 38 wga  5. DM test normal, will order 3rd trim labs; will do tdap next visit   6. Consents and btl consents signed today

## 2018-09-06 ENCOUNTER — CLINICAL SUPPORT (OUTPATIENT)
Dept: PEDIATRIC CARDIOLOGY | Facility: CLINIC | Age: 33
End: 2018-09-06
Payer: COMMERCIAL

## 2018-09-06 ENCOUNTER — OFFICE VISIT (OUTPATIENT)
Dept: PEDIATRIC CARDIOLOGY | Facility: CLINIC | Age: 33
End: 2018-09-06
Payer: COMMERCIAL

## 2018-09-06 VITALS
DIASTOLIC BLOOD PRESSURE: 70 MMHG | WEIGHT: 182.56 LBS | BODY MASS INDEX: 32.35 KG/M2 | HEIGHT: 63 IN | SYSTOLIC BLOOD PRESSURE: 112 MMHG

## 2018-09-06 DIAGNOSIS — O09.521 AMA (ADVANCED MATERNAL AGE) MULTIGRAVIDA 35+, FIRST TRIMESTER: Primary | ICD-10-CM

## 2018-09-06 DIAGNOSIS — O30.043 DICHORIONIC DIAMNIOTIC TWIN PREGNANCY IN THIRD TRIMESTER: ICD-10-CM

## 2018-09-06 DIAGNOSIS — O09.529 ANTEPARTUM MULTIGRAVIDA OF ADVANCED MATERNAL AGE: ICD-10-CM

## 2018-09-06 PROCEDURE — 3008F BODY MASS INDEX DOCD: CPT | Mod: CPTII,S$GLB,, | Performed by: PEDIATRICS

## 2018-09-06 PROCEDURE — 76827 ECHO EXAM OF FETAL HEART: CPT | Mod: 59,S$GLB,, | Performed by: PEDIATRICS

## 2018-09-06 PROCEDURE — 76825 ECHO EXAM OF FETAL HEART: CPT | Mod: 59,S$GLB,, | Performed by: PEDIATRICS

## 2018-09-06 PROCEDURE — 93325 DOPPLER ECHO COLOR FLOW MAPG: CPT | Mod: S$GLB,,, | Performed by: PEDIATRICS

## 2018-09-06 PROCEDURE — 99999 PR PBB SHADOW E&M-EST. PATIENT-LVL II: CPT | Mod: PBBFAC,,, | Performed by: PEDIATRICS

## 2018-09-06 PROCEDURE — 99203 OFFICE O/P NEW LOW 30 MIN: CPT | Mod: 25,S$GLB,, | Performed by: PEDIATRICS

## 2018-09-06 NOTE — PROGRESS NOTES
[facility] Fetal Cardiology Clinic    Today, I had the pleasure of evaluating Loulou Gann who is now 33 y.o. and carrying her second pregnancy at 32 5/7 weeks gestation with an MARY of 10/27/18, plan for delivery 10/15/18. She was referred for evaluation of the fetal heart due dichorionic diamniotic twin pregnancy with suboptimal views of the fetal hearts during the anatomy scan.      She is carrying a twin female fetuses, [place] and Gisele.  She has felt the babies move.       Obstetric History:    .  Her OB history is otherwise unremarkable.     Past Medical History:   Diagnosis Date    Dichorionic diamniotic twin pregnancy in first trimester 3/14/2018       Current Outpatient Medications:     aspirin (ECOTRIN) 81 MG EC tablet, Take 81 mg by mouth once daily., Disp: , Rfl:     prenatal 25/iron fum/folic/dha (PRENATAL-1 ORAL), Take by mouth., Disp: , Rfl:      Review of patient's allergies indicates:  No Known Allergies    Family History: Negative for congenital heart disease, early coronary artery disease, sudden unexplained death, connective tissues disorders, genetic syndromes, multiple miscarriages or other congenital anomalies.    Social History: Ms. Loulou Gann is  to the father of the babies.  The father of the baby is involved.     FETAL ECHOCARDIOGRAM (summary):  Twin A: Normal fetal echocardiogram.    Twin B: Normal fetal echocardiogram.  (Full report in electronic medical record)    Impression:  Twin active female fetuses at 32 5/7 wga.  Normal fetal echocardiogram.      Todays fetal echocardiogram is normal, within the limitations of fetal echocardiography.  I discussed with her that fetal echocardiography is insufficiently sensitive to rule out all septal defects, anomalies of pulmonary and systemic veins, arch anomalies, and some valvar abnormalities, nor can it ensure that the ductus arteriosus and foramen ovale will spontaneously close.      Recommendations:  Location, timing, and mode of delivery will be determined by the obstetrical team.  She does not require further follow-up in the fetal echocardiography clinic, but I would be happy to see her again if additional questions or concerns arise.    Should there be any concerns about the baby's heart after birth, a post-sydnee echocardiogram and cardiology consultation are recommended.           The above information was discussed in detail including the use of diagrams, 30 minutes were used for the evaluation with half of that time in discussion and counseling.

## 2018-09-13 ENCOUNTER — ROUTINE PRENATAL (OUTPATIENT)
Dept: OBSTETRICS AND GYNECOLOGY | Facility: CLINIC | Age: 33
End: 2018-09-13
Payer: COMMERCIAL

## 2018-09-13 ENCOUNTER — PROCEDURE VISIT (OUTPATIENT)
Dept: OBSTETRICS AND GYNECOLOGY | Facility: CLINIC | Age: 33
End: 2018-09-13
Payer: COMMERCIAL

## 2018-09-13 VITALS
WEIGHT: 187.38 LBS | SYSTOLIC BLOOD PRESSURE: 104 MMHG | BODY MASS INDEX: 33.19 KG/M2 | DIASTOLIC BLOOD PRESSURE: 68 MMHG

## 2018-09-13 DIAGNOSIS — O36.5990: Primary | ICD-10-CM

## 2018-09-13 DIAGNOSIS — Z3A.33 33 WEEKS GESTATION OF PREGNANCY: Primary | ICD-10-CM

## 2018-09-13 DIAGNOSIS — O30.009: Primary | ICD-10-CM

## 2018-09-13 DIAGNOSIS — O09.521 AMA (ADVANCED MATERNAL AGE) MULTIGRAVIDA 35+, FIRST TRIMESTER: ICD-10-CM

## 2018-09-13 DIAGNOSIS — Z3A.29 29 WEEKS GESTATION OF PREGNANCY: ICD-10-CM

## 2018-09-13 DIAGNOSIS — O30.043 DICHORIONIC DIAMNIOTIC TWIN PREGNANCY IN THIRD TRIMESTER: ICD-10-CM

## 2018-09-13 DIAGNOSIS — Z30.09 UNWANTED FERTILITY: ICD-10-CM

## 2018-09-13 PROCEDURE — 99999 PR PBB SHADOW E&M-EST. PATIENT-LVL II: CPT | Mod: PBBFAC,,, | Performed by: OBSTETRICS & GYNECOLOGY

## 2018-09-13 PROCEDURE — 76819 FETAL BIOPHYS PROFIL W/O NST: CPT | Mod: S$GLB,,, | Performed by: OBSTETRICS & GYNECOLOGY

## 2018-09-13 PROCEDURE — 0502F SUBSEQUENT PRENATAL CARE: CPT | Mod: S$GLB,,, | Performed by: OBSTETRICS & GYNECOLOGY

## 2018-09-13 NOTE — PROGRESS NOTES
@ 33    1. Di-di twins: f/u with MFM for growth US, plan for repeat  at 38 wga set up 10/15/18 @ 830 and continue  testing at 32 wga   -BPP:  on both today   2. AMA: decline testing, is on ASA; has appt with MFM for growth 18  3. Anemia: on iron BID   4. H/o : desires repeat at 38 wga  5. DM test normal, encouraged to do 3rd trim labs;  tdap done  6. Consents and btl consents signed  8/15

## 2018-09-19 ENCOUNTER — PATIENT MESSAGE (OUTPATIENT)
Dept: OBSTETRICS AND GYNECOLOGY | Facility: CLINIC | Age: 33
End: 2018-09-19

## 2018-09-19 ENCOUNTER — LAB VISIT (OUTPATIENT)
Dept: LAB | Facility: HOSPITAL | Age: 33
End: 2018-09-19
Attending: OBSTETRICS & GYNECOLOGY
Payer: COMMERCIAL

## 2018-09-19 ENCOUNTER — PROCEDURE VISIT (OUTPATIENT)
Dept: OBSTETRICS AND GYNECOLOGY | Facility: CLINIC | Age: 33
End: 2018-09-19
Payer: COMMERCIAL

## 2018-09-19 ENCOUNTER — ROUTINE PRENATAL (OUTPATIENT)
Dept: OBSTETRICS AND GYNECOLOGY | Facility: CLINIC | Age: 33
End: 2018-09-19
Payer: COMMERCIAL

## 2018-09-19 VITALS — WEIGHT: 187 LBS | SYSTOLIC BLOOD PRESSURE: 124 MMHG | DIASTOLIC BLOOD PRESSURE: 78 MMHG | BODY MASS INDEX: 33.13 KG/M2

## 2018-09-19 DIAGNOSIS — Z30.09 UNWANTED FERTILITY: ICD-10-CM

## 2018-09-19 DIAGNOSIS — D50.8 IRON DEFICIENCY ANEMIA SECONDARY TO INADEQUATE DIETARY IRON INTAKE: ICD-10-CM

## 2018-09-19 DIAGNOSIS — R60.9 PITTING EDEMA: ICD-10-CM

## 2018-09-19 DIAGNOSIS — O30.043 DICHORIONIC DIAMNIOTIC TWIN PREGNANCY IN THIRD TRIMESTER: ICD-10-CM

## 2018-09-19 DIAGNOSIS — Z3A.29 29 WEEKS GESTATION OF PREGNANCY: ICD-10-CM

## 2018-09-19 DIAGNOSIS — O09.521 AMA (ADVANCED MATERNAL AGE) MULTIGRAVIDA 35+, FIRST TRIMESTER: ICD-10-CM

## 2018-09-19 DIAGNOSIS — Z3A.34 34 WEEKS GESTATION OF PREGNANCY: Primary | ICD-10-CM

## 2018-09-19 LAB
ALBUMIN SERPL BCP-MCNC: 2.2 G/DL
ALP SERPL-CCNC: 273 U/L
ALT SERPL W/O P-5'-P-CCNC: 6 U/L
ANION GAP SERPL CALC-SCNC: 9 MMOL/L
ANISOCYTOSIS BLD QL SMEAR: SLIGHT
AST SERPL-CCNC: 19 U/L
BASOPHILS # BLD AUTO: 0.03 K/UL
BASOPHILS NFR BLD: 0.4 %
BILIRUB SERPL-MCNC: 0.6 MG/DL
BUN SERPL-MCNC: 7 MG/DL
CALCIUM SERPL-MCNC: 8.7 MG/DL
CHLORIDE SERPL-SCNC: 108 MMOL/L
CO2 SERPL-SCNC: 19 MMOL/L
CREAT SERPL-MCNC: 0.8 MG/DL
DIFFERENTIAL METHOD: ABNORMAL
EOSINOPHIL # BLD AUTO: 0.1 K/UL
EOSINOPHIL NFR BLD: 0.8 %
ERYTHROCYTE [DISTWIDTH] IN BLOOD BY AUTOMATED COUNT: 16.5 %
EST. GFR  (AFRICAN AMERICAN): >60 ML/MIN/1.73 M^2
EST. GFR  (NON AFRICAN AMERICAN): >60 ML/MIN/1.73 M^2
GIANT PLATELETS BLD QL SMEAR: PRESENT
GLUCOSE SERPL-MCNC: 67 MG/DL
HCT VFR BLD AUTO: 28.3 %
HGB BLD-MCNC: 8.5 G/DL
HYPOCHROMIA BLD QL SMEAR: ABNORMAL
LYMPHOCYTES # BLD AUTO: 1.7 K/UL
LYMPHOCYTES NFR BLD: 21.5 %
MCH RBC QN AUTO: 22.2 PG
MCHC RBC AUTO-ENTMCNC: 30 G/DL
MCV RBC AUTO: 74 FL
MONOCYTES # BLD AUTO: 0.8 K/UL
MONOCYTES NFR BLD: 10.3 %
NEUTROPHILS # BLD AUTO: 5 K/UL
NEUTROPHILS NFR BLD: 67 %
PLATELET # BLD AUTO: 160 K/UL
PLATELET BLD QL SMEAR: ABNORMAL
PMV BLD AUTO: ABNORMAL FL
POLYCHROMASIA BLD QL SMEAR: ABNORMAL
POTASSIUM SERPL-SCNC: 4.7 MMOL/L
PROT SERPL-MCNC: 6.2 G/DL
RBC # BLD AUTO: 3.83 M/UL
SODIUM SERPL-SCNC: 136 MMOL/L
WBC # BLD AUTO: 7.68 K/UL

## 2018-09-19 PROCEDURE — 80053 COMPREHEN METABOLIC PANEL: CPT

## 2018-09-19 PROCEDURE — 99999 PR PBB SHADOW E&M-EST. PATIENT-LVL II: CPT | Mod: PBBFAC,,, | Performed by: OBSTETRICS & GYNECOLOGY

## 2018-09-19 PROCEDURE — 86703 HIV-1/HIV-2 1 RESULT ANTBDY: CPT

## 2018-09-19 PROCEDURE — 85025 COMPLETE CBC W/AUTO DIFF WBC: CPT

## 2018-09-19 PROCEDURE — 0502F SUBSEQUENT PRENATAL CARE: CPT | Mod: S$GLB,,, | Performed by: OBSTETRICS & GYNECOLOGY

## 2018-09-19 PROCEDURE — 76819 FETAL BIOPHYS PROFIL W/O NST: CPT | Mod: S$GLB,,, | Performed by: OBSTETRICS & GYNECOLOGY

## 2018-09-19 PROCEDURE — 86592 SYPHILIS TEST NON-TREP QUAL: CPT

## 2018-09-19 PROCEDURE — 36415 COLL VENOUS BLD VENIPUNCTURE: CPT

## 2018-09-19 RX ORDER — FERROUS SULFATE 325(65) MG
325 TABLET, DELAYED RELEASE (ENTERIC COATED) ORAL 2 TIMES DAILY
Qty: 60 TABLET | Refills: 3 | Status: SHIPPED | OUTPATIENT
Start: 2018-09-19 | End: 2018-11-07

## 2018-09-19 NOTE — PROGRESS NOTES
@ 34    1. Di-di twins: f/u with MFM for growth US, plan for repeat  at 38 wga set up 10/15/18 @ 830 and continue  testing at 32 wga   -BPP:  on both today   2. AMA: decline testing, is on ASA; has appt with MFM for growth 18  3. Anemia: on iron BID   4. H/o : desires repeat at 38 wga  5. DM test normal, encouraged to do 3rd trim labs;  tdap done  6. Consents and btl consents signed  8/15  7. Significant swelling in legs, 2+ up to thighs, no swelling in hands or face, CTA B, and weight stable, we discussed pre-e precautions of HA/ scotoma, RUQ pain or face/hands swelling to come to L&D and will add CMP to lab work.

## 2018-09-20 LAB
HIV 1+2 AB+HIV1 P24 AG SERPL QL IA: NEGATIVE
RPR SER QL: NORMAL

## 2018-09-24 ENCOUNTER — HOSPITAL ENCOUNTER (INPATIENT)
Facility: HOSPITAL | Age: 33
LOS: 3 days | Discharge: HOME | End: 2018-09-27
Attending: OBSTETRICS & GYNECOLOGY | Admitting: OBSTETRICS & GYNECOLOGY
Payer: COMMERCIAL

## 2018-09-24 ENCOUNTER — ANESTHESIA (OUTPATIENT)
Dept: OBSTETRICS AND GYNECOLOGY | Facility: HOSPITAL | Age: 33
End: 2018-09-24
Payer: COMMERCIAL

## 2018-09-24 ENCOUNTER — PROCEDURE VISIT (OUTPATIENT)
Dept: MATERNAL FETAL MEDICINE | Facility: HOSPITAL | Age: 33
End: 2018-09-24
Payer: COMMERCIAL

## 2018-09-24 ENCOUNTER — ANESTHESIA EVENT (OUTPATIENT)
Dept: OBSTETRICS AND GYNECOLOGY | Facility: HOSPITAL | Age: 33
End: 2018-09-24
Payer: COMMERCIAL

## 2018-09-24 VITALS — WEIGHT: 185 LBS | SYSTOLIC BLOOD PRESSURE: 112 MMHG | DIASTOLIC BLOOD PRESSURE: 60 MMHG | BODY MASS INDEX: 32.77 KG/M2

## 2018-09-24 DIAGNOSIS — Z30.09 UNWANTED FERTILITY: ICD-10-CM

## 2018-09-24 DIAGNOSIS — O09.521 AMA (ADVANCED MATERNAL AGE) MULTIGRAVIDA 35+, FIRST TRIMESTER: ICD-10-CM

## 2018-09-24 DIAGNOSIS — Z36.4 ANTENATAL SCREENING FOR FETAL GROWTH RETARDATION USING ULTRASONICS: ICD-10-CM

## 2018-09-24 DIAGNOSIS — O13.9 PREGNANCY INDUCED HYPERTENSION, ANTEPARTUM: ICD-10-CM

## 2018-09-24 DIAGNOSIS — O30.043 DICHORIONIC DIAMNIOTIC TWIN PREGNANCY IN THIRD TRIMESTER: ICD-10-CM

## 2018-09-24 DIAGNOSIS — O30.001 TWIN GESTATION IN FIRST TRIMESTER, UNSPECIFIED MULTIPLE GESTATION TYPE: ICD-10-CM

## 2018-09-24 DIAGNOSIS — Z98.891 H/O: C-SECTION: Primary | ICD-10-CM

## 2018-09-24 DIAGNOSIS — O36.5932 POOR FETAL GROWTH AFFECTING MANAGEMENT OF MOTHER IN THIRD TRIMESTER, FETUS 2 OF MULTIPLE GESTATION: ICD-10-CM

## 2018-09-24 DIAGNOSIS — O30.049 TWIN PREGNANCY, TWINS DICHORIONIC AND DIAMNIOTIC: ICD-10-CM

## 2018-09-24 PROBLEM — Z36.89 NON-STRESS TEST REACTIVE ON FETAL SURVEILLANCE: Status: RESOLVED | Noted: 2018-09-05 | Resolved: 2018-09-24

## 2018-09-24 PROBLEM — Z98.51 HISTORY OF BILATERAL TUBAL LIGATION: Status: ACTIVE | Noted: 2018-09-24

## 2018-09-24 LAB
ABO GROUP BLD: NORMAL
ANISOCYTOSIS BLD QL SMEAR: ABNORMAL
ANISOCYTOSIS BLD QL SMEAR: SLIGHT
BASOPHILS # BLD AUTO: 0.02 K/UL
BASOPHILS # BLD AUTO: ABNORMAL K/UL
BASOPHILS NFR BLD: 0 %
BASOPHILS NFR BLD: 0.3 %
BLD GP AB SCN CELLS X3 SERPL QL: NORMAL
DIFFERENTIAL METHOD: ABNORMAL
DIFFERENTIAL METHOD: ABNORMAL
EOSINOPHIL # BLD AUTO: 0.1 K/UL
EOSINOPHIL # BLD AUTO: ABNORMAL K/UL
EOSINOPHIL NFR BLD: 0.6 %
EOSINOPHIL NFR BLD: 1 %
ERYTHROCYTE [DISTWIDTH] IN BLOOD BY AUTOMATED COUNT: 16.8 %
ERYTHROCYTE [DISTWIDTH] IN BLOOD BY AUTOMATED COUNT: 16.9 %
FIBRINOGEN PPP-MCNC: 443 MG/DL
GIANT PLATELETS BLD QL SMEAR: PRESENT
HCT VFR BLD AUTO: 26.5 %
HCT VFR BLD AUTO: 29.7 %
HGB BLD-MCNC: 7.9 G/DL
HGB BLD-MCNC: 8.8 G/DL
HYPOCHROMIA BLD QL SMEAR: ABNORMAL
HYPOCHROMIA BLD QL SMEAR: ABNORMAL
LYMPHOCYTES # BLD AUTO: 1.3 K/UL
LYMPHOCYTES # BLD AUTO: ABNORMAL K/UL
LYMPHOCYTES NFR BLD: 15.8 %
LYMPHOCYTES NFR BLD: 17 %
MCH RBC QN AUTO: 21.9 PG
MCH RBC QN AUTO: 22.2 PG
MCHC RBC AUTO-ENTMCNC: 29.6 G/DL
MCHC RBC AUTO-ENTMCNC: 29.8 G/DL
MCV RBC AUTO: 74 FL
MCV RBC AUTO: 75 FL
METAMYELOCYTES NFR BLD MANUAL: 2 %
MONOCYTES # BLD AUTO: 0.9 K/UL
MONOCYTES # BLD AUTO: ABNORMAL K/UL
MONOCYTES NFR BLD: 0 %
MONOCYTES NFR BLD: 11 %
NEUTROPHILS # BLD AUTO: 5.5 K/UL
NEUTROPHILS # BLD AUTO: ABNORMAL K/UL
NEUTROPHILS NFR BLD: 72.3 %
NEUTROPHILS NFR BLD: 78 %
NEUTS BAND NFR BLD MANUAL: 2 %
PLATELET # BLD AUTO: 141 K/UL
PLATELET # BLD AUTO: 180 K/UL
PLATELET BLD QL SMEAR: ABNORMAL
PLATELET BLD QL SMEAR: ABNORMAL
PMV BLD AUTO: ABNORMAL FL
PMV BLD AUTO: ABNORMAL FL
POIKILOCYTOSIS BLD QL SMEAR: SLIGHT
POLYCHROMASIA BLD QL SMEAR: ABNORMAL
RBC # BLD AUTO: 3.6 M/UL
RBC # BLD AUTO: 3.96 M/UL
RH BLD: NORMAL
WBC # BLD AUTO: 7.89 K/UL
WBC # BLD AUTO: 9.93 K/UL

## 2018-09-24 PROCEDURE — 63600175 PHARM REV CODE 636 W HCPCS: Performed by: ANESTHESIOLOGY

## 2018-09-24 PROCEDURE — 86850 RBC ANTIBODY SCREEN: CPT

## 2018-09-24 PROCEDURE — 36000681 HC C/S TUBAL LIGATION LEVEL II

## 2018-09-24 PROCEDURE — 86901 BLOOD TYPING SEROLOGIC RH(D): CPT

## 2018-09-24 PROCEDURE — 37000009 HC ANESTHESIA EA ADD 15 MINS: Performed by: OBSTETRICS & GYNECOLOGY

## 2018-09-24 PROCEDURE — 85007 BL SMEAR W/DIFF WBC COUNT: CPT

## 2018-09-24 PROCEDURE — 59510 CESAREAN DELIVERY: CPT | Mod: 76,AT,, | Performed by: OBSTETRICS & GYNECOLOGY

## 2018-09-24 PROCEDURE — S0028 INJECTION, FAMOTIDINE, 20 MG: HCPCS | Performed by: ANESTHESIOLOGY

## 2018-09-24 PROCEDURE — 76820 UMBILICAL ARTERY ECHO: CPT

## 2018-09-24 PROCEDURE — 37000008 HC ANESTHESIA 1ST 15 MINUTES: Performed by: OBSTETRICS & GYNECOLOGY

## 2018-09-24 PROCEDURE — 25000003 PHARM REV CODE 250: Performed by: ANESTHESIOLOGY

## 2018-09-24 PROCEDURE — 25000003 PHARM REV CODE 250: Performed by: OBSTETRICS & GYNECOLOGY

## 2018-09-24 PROCEDURE — 85384 FIBRINOGEN ACTIVITY: CPT

## 2018-09-24 PROCEDURE — 0UB50ZZ EXCISION OF RIGHT FALLOPIAN TUBE, OPEN APPROACH: ICD-10-PCS | Performed by: OBSTETRICS & GYNECOLOGY

## 2018-09-24 PROCEDURE — 88307 TISSUE EXAM BY PATHOLOGIST: CPT | Performed by: PATHOLOGY

## 2018-09-24 PROCEDURE — 27201121 HC TRAY,SPINAL-HYPERBARIC: Performed by: ANESTHESIOLOGY

## 2018-09-24 PROCEDURE — 58611 LIGATE OVIDUCT(S) ADD-ON: CPT | Mod: ,,, | Performed by: OBSTETRICS & GYNECOLOGY

## 2018-09-24 PROCEDURE — 76816 OB US FOLLOW-UP PER FETUS: CPT | Mod: 59

## 2018-09-24 PROCEDURE — 76820 UMBILICAL ARTERY ECHO: CPT | Mod: 26,,, | Performed by: OBSTETRICS & GYNECOLOGY

## 2018-09-24 PROCEDURE — 76819 FETAL BIOPHYS PROFIL W/O NST: CPT

## 2018-09-24 PROCEDURE — 51702 INSERT TEMP BLADDER CATH: CPT

## 2018-09-24 PROCEDURE — 86920 COMPATIBILITY TEST SPIN: CPT

## 2018-09-24 PROCEDURE — 11000001 HC ACUTE MED/SURG PRIVATE ROOM

## 2018-09-24 PROCEDURE — 85027 COMPLETE CBC AUTOMATED: CPT

## 2018-09-24 PROCEDURE — 25000003 PHARM REV CODE 250

## 2018-09-24 PROCEDURE — 76819 FETAL BIOPHYS PROFIL W/O NST: CPT | Mod: 26,59,, | Performed by: OBSTETRICS & GYNECOLOGY

## 2018-09-24 PROCEDURE — 88302 TISSUE EXAM BY PATHOLOGIST: CPT | Mod: 26,,, | Performed by: PATHOLOGY

## 2018-09-24 PROCEDURE — 88307 TISSUE EXAM BY PATHOLOGIST: CPT | Mod: 26,,, | Performed by: PATHOLOGY

## 2018-09-24 PROCEDURE — 99499 UNLISTED E&M SERVICE: CPT | Mod: ,,, | Performed by: OBSTETRICS & GYNECOLOGY

## 2018-09-24 PROCEDURE — 36415 COLL VENOUS BLD VENIPUNCTURE: CPT

## 2018-09-24 PROCEDURE — 88302 TISSUE EXAM BY PATHOLOGIST: CPT | Performed by: PATHOLOGY

## 2018-09-24 PROCEDURE — 76816 OB US FOLLOW-UP PER FETUS: CPT | Mod: 26,59,, | Performed by: OBSTETRICS & GYNECOLOGY

## 2018-09-24 PROCEDURE — 85025 COMPLETE CBC W/AUTO DIFF WBC: CPT

## 2018-09-24 RX ORDER — OXYCODONE AND ACETAMINOPHEN 5; 325 MG/1; MG/1
1 TABLET ORAL EVERY 4 HOURS PRN
Status: DISCONTINUED | OUTPATIENT
Start: 2018-09-24 | End: 2018-09-27 | Stop reason: HOSPADM

## 2018-09-24 RX ORDER — DIPHENHYDRAMINE HCL 25 MG
25 CAPSULE ORAL EVERY 4 HOURS PRN
Status: DISCONTINUED | OUTPATIENT
Start: 2018-09-24 | End: 2018-09-27 | Stop reason: HOSPADM

## 2018-09-24 RX ORDER — HYDROCODONE BITARTRATE AND ACETAMINOPHEN 500; 5 MG/1; MG/1
TABLET ORAL
Status: DISCONTINUED | OUTPATIENT
Start: 2018-09-24 | End: 2018-09-24

## 2018-09-24 RX ORDER — METHYLERGONOVINE MALEATE 0.2 MG/ML
INJECTION INTRAVENOUS
Status: DISCONTINUED
Start: 2018-09-24 | End: 2018-09-24 | Stop reason: WASHOUT

## 2018-09-24 RX ORDER — FAMOTIDINE 10 MG/ML
INJECTION INTRAVENOUS
Status: DISPENSED
Start: 2018-09-24 | End: 2018-09-24

## 2018-09-24 RX ORDER — OXYTOCIN/RINGER'S LACTATE 20/1000 ML
333 PLASTIC BAG, INJECTION (ML) INTRAVENOUS CONTINUOUS
Status: DISCONTINUED | OUTPATIENT
Start: 2018-09-24 | End: 2018-09-24

## 2018-09-24 RX ORDER — IBUPROFEN 400 MG/1
800 TABLET ORAL EVERY 8 HOURS PRN
Status: DISCONTINUED | OUTPATIENT
Start: 2018-09-24 | End: 2018-09-27 | Stop reason: HOSPADM

## 2018-09-24 RX ORDER — OXYTOCIN/RINGER'S LACTATE 20/1000 ML
41.65 PLASTIC BAG, INJECTION (ML) INTRAVENOUS CONTINUOUS
Status: ACTIVE | OUTPATIENT
Start: 2018-09-24 | End: 2018-09-24

## 2018-09-24 RX ORDER — DOCUSATE SODIUM 100 MG/1
200 CAPSULE, LIQUID FILLED ORAL 2 TIMES DAILY
Status: DISCONTINUED | OUTPATIENT
Start: 2018-09-24 | End: 2018-09-27 | Stop reason: HOSPADM

## 2018-09-24 RX ORDER — SODIUM CITRATE AND CITRIC ACID MONOHYDRATE 334; 500 MG/5ML; MG/5ML
SOLUTION ORAL
Status: COMPLETED
Start: 2018-09-24 | End: 2018-09-24

## 2018-09-24 RX ORDER — BUPIVACAINE HYDROCHLORIDE 2.5 MG/ML
30 INJECTION, SOLUTION EPIDURAL; INFILTRATION; INTRACAUDAL ONCE
Status: COMPLETED | OUTPATIENT
Start: 2018-09-24 | End: 2018-09-24

## 2018-09-24 RX ORDER — HYDROMORPHONE HYDROCHLORIDE 1 MG/ML
1 INJECTION, SOLUTION INTRAMUSCULAR; INTRAVENOUS; SUBCUTANEOUS EVERY 4 HOURS PRN
Status: DISCONTINUED | OUTPATIENT
Start: 2018-09-24 | End: 2018-09-27 | Stop reason: HOSPADM

## 2018-09-24 RX ORDER — MUPIROCIN 20 MG/G
OINTMENT TOPICAL
Status: DISCONTINUED | OUTPATIENT
Start: 2018-09-24 | End: 2018-09-24

## 2018-09-24 RX ORDER — MORPHINE SULFATE 4 MG/ML
2 INJECTION, SOLUTION INTRAMUSCULAR; INTRAVENOUS
Status: DISCONTINUED | OUTPATIENT
Start: 2018-09-24 | End: 2018-09-27 | Stop reason: HOSPADM

## 2018-09-24 RX ORDER — OXYCODONE AND ACETAMINOPHEN 10; 325 MG/1; MG/1
1 TABLET ORAL EVERY 4 HOURS PRN
Status: DISCONTINUED | OUTPATIENT
Start: 2018-09-24 | End: 2018-09-27 | Stop reason: HOSPADM

## 2018-09-24 RX ORDER — SODIUM CHLORIDE, SODIUM LACTATE, POTASSIUM CHLORIDE, CALCIUM CHLORIDE 600; 310; 30; 20 MG/100ML; MG/100ML; MG/100ML; MG/100ML
INJECTION, SOLUTION INTRAVENOUS CONTINUOUS PRN
Status: DISCONTINUED | OUTPATIENT
Start: 2018-09-24 | End: 2018-09-24

## 2018-09-24 RX ORDER — METOCLOPRAMIDE HYDROCHLORIDE 5 MG/ML
10 INJECTION INTRAMUSCULAR; INTRAVENOUS
Status: COMPLETED | OUTPATIENT
Start: 2018-09-24 | End: 2018-09-24

## 2018-09-24 RX ORDER — AMOXICILLIN 250 MG
1 CAPSULE ORAL NIGHTLY PRN
Status: DISCONTINUED | OUTPATIENT
Start: 2018-09-24 | End: 2018-09-27 | Stop reason: HOSPADM

## 2018-09-24 RX ORDER — SODIUM CHLORIDE, SODIUM LACTATE, POTASSIUM CHLORIDE, CALCIUM CHLORIDE 600; 310; 30; 20 MG/100ML; MG/100ML; MG/100ML; MG/100ML
INJECTION, SOLUTION INTRAVENOUS CONTINUOUS
Status: ACTIVE | OUTPATIENT
Start: 2018-09-24 | End: 2018-09-25

## 2018-09-24 RX ORDER — MISOPROSTOL 200 UG/1
800 TABLET ORAL
Status: DISCONTINUED | OUTPATIENT
Start: 2018-09-24 | End: 2018-09-24

## 2018-09-24 RX ORDER — ONDANSETRON 2 MG/ML
4 INJECTION INTRAMUSCULAR; INTRAVENOUS EVERY 6 HOURS PRN
Status: DISCONTINUED | OUTPATIENT
Start: 2018-09-24 | End: 2018-09-27 | Stop reason: HOSPADM

## 2018-09-24 RX ORDER — NALBUPHINE HYDROCHLORIDE 20 MG/ML
2.5 INJECTION, SOLUTION INTRAMUSCULAR; INTRAVENOUS; SUBCUTANEOUS EVERY 6 HOURS PRN
Status: DISCONTINUED | OUTPATIENT
Start: 2018-09-24 | End: 2018-09-24

## 2018-09-24 RX ORDER — BUPIVACAINE HYDROCHLORIDE 7.5 MG/ML
INJECTION, SOLUTION INTRASPINAL
Status: DISCONTINUED | OUTPATIENT
Start: 2018-09-24 | End: 2018-09-24

## 2018-09-24 RX ORDER — MUPIROCIN 20 MG/G
1 OINTMENT TOPICAL 2 TIMES DAILY
Status: DISCONTINUED | OUTPATIENT
Start: 2018-09-24 | End: 2018-09-27 | Stop reason: HOSPADM

## 2018-09-24 RX ORDER — CEFAZOLIN SODIUM 2 G/50ML
2 SOLUTION INTRAVENOUS
Status: DISCONTINUED | OUTPATIENT
Start: 2018-09-24 | End: 2018-09-24

## 2018-09-24 RX ORDER — SODIUM CHLORIDE, SODIUM LACTATE, POTASSIUM CHLORIDE, CALCIUM CHLORIDE 600; 310; 30; 20 MG/100ML; MG/100ML; MG/100ML; MG/100ML
INJECTION, SOLUTION INTRAVENOUS CONTINUOUS
Status: DISCONTINUED | OUTPATIENT
Start: 2018-09-24 | End: 2018-09-24

## 2018-09-24 RX ORDER — OXYTOCIN 10 [USP'U]/ML
INJECTION, SOLUTION INTRAMUSCULAR; INTRAVENOUS
Status: DISCONTINUED | OUTPATIENT
Start: 2018-09-24 | End: 2018-09-24

## 2018-09-24 RX ORDER — ACETAMINOPHEN 10 MG/ML
1000 INJECTION, SOLUTION INTRAVENOUS ONCE
Status: COMPLETED | OUTPATIENT
Start: 2018-09-24 | End: 2018-09-24

## 2018-09-24 RX ORDER — SIMETHICONE 80 MG
1 TABLET,CHEWABLE ORAL EVERY 6 HOURS PRN
Status: DISCONTINUED | OUTPATIENT
Start: 2018-09-24 | End: 2018-09-27 | Stop reason: HOSPADM

## 2018-09-24 RX ORDER — FENTANYL CITRATE 50 UG/ML
INJECTION, SOLUTION INTRAMUSCULAR; INTRAVENOUS
Status: DISCONTINUED | OUTPATIENT
Start: 2018-09-24 | End: 2018-09-24

## 2018-09-24 RX ORDER — METOCLOPRAMIDE HYDROCHLORIDE 5 MG/ML
INJECTION INTRAMUSCULAR; INTRAVENOUS
Status: DISPENSED
Start: 2018-09-24 | End: 2018-09-24

## 2018-09-24 RX ORDER — METHYLERGONOVINE MALEATE 0.2 MG/ML
200 INJECTION INTRAVENOUS
Status: DISCONTINUED | OUTPATIENT
Start: 2018-09-24 | End: 2018-09-24

## 2018-09-24 RX ORDER — FAMOTIDINE 10 MG/ML
20 INJECTION INTRAVENOUS
Status: DISCONTINUED | OUTPATIENT
Start: 2018-09-24 | End: 2018-09-24

## 2018-09-24 RX ORDER — ONDANSETRON 8 MG/1
8 TABLET, ORALLY DISINTEGRATING ORAL EVERY 8 HOURS PRN
Status: DISCONTINUED | OUTPATIENT
Start: 2018-09-24 | End: 2018-09-27 | Stop reason: HOSPADM

## 2018-09-24 RX ORDER — HYDROCORTISONE 25 MG/G
CREAM TOPICAL 3 TIMES DAILY PRN
Status: DISCONTINUED | OUTPATIENT
Start: 2018-09-24 | End: 2018-09-27 | Stop reason: HOSPADM

## 2018-09-24 RX ORDER — PHENYLEPHRINE HYDROCHLORIDE 10 MG/ML
INJECTION INTRAVENOUS
Status: DISCONTINUED | OUTPATIENT
Start: 2018-09-24 | End: 2018-09-24

## 2018-09-24 RX ORDER — CARBOPROST TROMETHAMINE 250 UG/ML
INJECTION, SOLUTION INTRAMUSCULAR
Status: DISCONTINUED
Start: 2018-09-24 | End: 2018-09-24 | Stop reason: WASHOUT

## 2018-09-24 RX ORDER — ONDANSETRON HYDROCHLORIDE 2 MG/ML
INJECTION, SOLUTION INTRAMUSCULAR; INTRAVENOUS
Status: DISCONTINUED | OUTPATIENT
Start: 2018-09-24 | End: 2018-09-24

## 2018-09-24 RX ORDER — SODIUM CITRATE AND CITRIC ACID MONOHYDRATE 334; 500 MG/5ML; MG/5ML
30 SOLUTION ORAL
Status: DISCONTINUED | OUTPATIENT
Start: 2018-09-24 | End: 2018-09-24

## 2018-09-24 RX ORDER — MORPHINE SULFATE 1 MG/ML
INJECTION, SOLUTION EPIDURAL; INTRATHECAL; INTRAVENOUS
Status: DISCONTINUED | OUTPATIENT
Start: 2018-09-24 | End: 2018-09-24

## 2018-09-24 RX ORDER — PROMETHAZINE HYDROCHLORIDE 25 MG/ML
INJECTION, SOLUTION INTRAMUSCULAR; INTRAVENOUS
Status: DISPENSED
Start: 2018-09-24 | End: 2018-09-25

## 2018-09-24 RX ORDER — CARBOPROST TROMETHAMINE 250 UG/ML
250 INJECTION, SOLUTION INTRAMUSCULAR
Status: DISCONTINUED | OUTPATIENT
Start: 2018-09-24 | End: 2018-09-24

## 2018-09-24 RX ORDER — FERROUS SULFATE 325(65) MG
325 TABLET, DELAYED RELEASE (ENTERIC COATED) ORAL 2 TIMES DAILY
Status: DISCONTINUED | OUTPATIENT
Start: 2018-09-24 | End: 2018-09-27 | Stop reason: HOSPADM

## 2018-09-24 RX ADMIN — PROMETHAZINE HYDROCHLORIDE 12.5 MG: 25 INJECTION INTRAMUSCULAR; INTRAVENOUS at 02:09

## 2018-09-24 RX ADMIN — SODIUM CHLORIDE, SODIUM LACTATE, POTASSIUM CHLORIDE, AND CALCIUM CHLORIDE 1000 ML: .6; .31; .03; .02 INJECTION, SOLUTION INTRAVENOUS at 11:09

## 2018-09-24 RX ADMIN — FAMOTIDINE 20 MG: 10 INJECTION INTRAVENOUS at 11:09

## 2018-09-24 RX ADMIN — METOCLOPRAMIDE 10 MG: 5 INJECTION, SOLUTION INTRAMUSCULAR; INTRAVENOUS at 11:09

## 2018-09-24 RX ADMIN — BUPIVACAINE HYDROCHLORIDE 1.5 ML: 7.5 INJECTION, SOLUTION SUBARACHNOID at 12:09

## 2018-09-24 RX ADMIN — PHENYLEPHRINE HYDROCHLORIDE 100 MCG: 10 INJECTION INTRAVENOUS at 01:09

## 2018-09-24 RX ADMIN — SODIUM CITRATE AND CITRIC ACID MONOHYDRATE 30 ML: 500; 334 SOLUTION ORAL at 12:09

## 2018-09-24 RX ADMIN — IBUPROFEN 800 MG: 400 TABLET ORAL at 08:09

## 2018-09-24 RX ADMIN — MUPIROCIN 1 G: 20 OINTMENT TOPICAL at 08:09

## 2018-09-24 RX ADMIN — MORPHINE SULFATE 0.2 MG: 1 INJECTION, SOLUTION EPIDURAL; INTRATHECAL; INTRAVENOUS at 12:09

## 2018-09-24 RX ADMIN — SODIUM CHLORIDE, SODIUM LACTATE, POTASSIUM CHLORIDE, AND CALCIUM CHLORIDE 1000 ML: .6; .31; .03; .02 INJECTION, SOLUTION INTRAVENOUS at 12:09

## 2018-09-24 RX ADMIN — SODIUM CHLORIDE, SODIUM LACTATE, POTASSIUM CHLORIDE, AND CALCIUM CHLORIDE: .6; .31; .03; .02 INJECTION, SOLUTION INTRAVENOUS at 01:09

## 2018-09-24 RX ADMIN — BUPIVACAINE HYDROCHLORIDE 75 MG: 2.5 INJECTION, SOLUTION EPIDURAL; INFILTRATION; INTRACAUDAL; PERINEURAL at 01:09

## 2018-09-24 RX ADMIN — SODIUM CHLORIDE, SODIUM LACTATE, POTASSIUM CHLORIDE, AND CALCIUM CHLORIDE: .6; .31; .03; .02 INJECTION, SOLUTION INTRAVENOUS at 12:09

## 2018-09-24 RX ADMIN — DEXTROSE 2 G: 50 INJECTION, SOLUTION INTRAVENOUS at 12:09

## 2018-09-24 RX ADMIN — ONDANSETRON 4 MG: 2 INJECTION, SOLUTION INTRAMUSCULAR; INTRAVENOUS at 12:09

## 2018-09-24 RX ADMIN — OXYTOCIN 20 UNITS: 10 INJECTION, SOLUTION INTRAMUSCULAR; INTRAVENOUS at 01:09

## 2018-09-24 RX ADMIN — FENTANYL CITRATE 10 MCG: 50 INJECTION, SOLUTION INTRAMUSCULAR; INTRAVENOUS at 12:09

## 2018-09-24 RX ADMIN — SODIUM CITRATE AND CITRIC ACID MONOHYDRATE 30 ML: 334; 500 SOLUTION ORAL at 12:09

## 2018-09-24 RX ADMIN — MORPHINE SULFATE 2 MG: 4 INJECTION, SOLUTION INTRAMUSCULAR; INTRAVENOUS at 04:09

## 2018-09-24 RX ADMIN — PHENYLEPHRINE HYDROCHLORIDE 100 MCG: 10 INJECTION INTRAVENOUS at 12:09

## 2018-09-24 RX ADMIN — ACETAMINOPHEN 1000 MG: 10 INJECTION, SOLUTION INTRAVENOUS at 03:09

## 2018-09-24 RX ADMIN — FERROUS SULFATE TAB EC 325 MG (65 MG FE EQUIVALENT) 325 MG: 325 (65 FE) TABLET DELAYED RESPONSE at 08:09

## 2018-09-24 RX ADMIN — OXYTOCIN 10 UNITS: 10 INJECTION, SOLUTION INTRAMUSCULAR; INTRAVENOUS at 01:09

## 2018-09-24 NOTE — H&P
CC: repeat      HPI:   Loulou Gann is a 33 y.o. female  with di-di twins at 35 2/7 EGA who presents here for repeat . She was seen by MFM today and one twin was measuring <1% with elevated dopplers and low fluid. Their recommendation is to proceed with immediate . Other twin is 80%. This pregnancy has been otherwise uncomplicated.     ROS:  GENERAL: Denies weight gain or weight loss. Feeling well overall.   SKIN: Denies rash or lesions.   HEAD: Denies head injury or headache.   CHEST: Denies chest pain or shortness of breath.   CARDIOVASCULAR: Denies palpitations or left sided chest pain.   ABDOMEN: No abdominal pain, constipation, diarrhea, nausea, vomiting or rectal bleeding.   URINARY: No frequency, dysuria, hematuria, or burning on urination.  REPRODUCTIVE: See HPI.     Past Medical History:   Diagnosis Date    Dichorionic diamniotic twin pregnancy in first trimester 3/14/2018    Dichorionic diamniotic twin pregnancy in third trimester 3/14/2018     Past Surgical History:   Procedure Laterality Date    OOPHORECTOMY       Family History   Problem Relation Age of Onset    No Known Problems Son     Congenital heart disease Neg Hx     Pacemaker/defibrilator Neg Hx     Early death Neg Hx      Allergies: Patient has no known allergies.  Social History     Socioeconomic History    Marital status: Single     Spouse name: Not on file    Number of children: Not on file    Years of education: Not on file    Highest education level: Not on file   Social Needs    Financial resource strain: Not on file    Food insecurity - worry: Not on file    Food insecurity - inability: Not on file    Transportation needs - medical: Not on file    Transportation needs - non-medical: Not on file   Occupational History    Not on file   Tobacco Use    Smoking status: Never Smoker    Smokeless tobacco: Never Used   Substance and Sexual Activity    Alcohol use: No    Drug use: No    Sexual  activity: Yes     Partners: Male   Other Topics Concern    Not on file   Social History Narrative    Not on file     Meds: ASA     PE:   BP: (112)/(60)     APPEARANCE: Well nourished, well developed, in no acute distress.  CHEST: Lungs clear to auscultation.  HEART: Regular rate and rhythm, no murmurs, rubs or gallops.  ABDOMEN:  Gravid.     /mod mercy/+accel -decel   130/mod mercy/+accel -decel   [place]: irregular contractions     Vertex and oblique     Assessment:  IUP 35 2/7 weeks  1. Di-di twins with one IUGR and elevated dopplers: will proceed with immediate RLTCD. Discussed with patient that due to their size and gestational age they may need to be sent to [facility] for care  2. Unwanted fertility: will proceed with BTL   3. Anemia: discussed with patient depending on EBL may need blood transfusion.

## 2018-09-24 NOTE — OP NOTE
Operative Note    Date: 2018  Preoperative Diagnosis: 1. IUP at 35 wga 2. Di-di twins 3. IUGR, elevated dopplers and oligohydramniosis of twin B 4. H/o  5. Anemia 6. Unwanted fertility 7. AMA  Postoperative Diagnosis: s/p RLTCD with BTL   Procedure:RLTCD with BTL   Type of Anesthesia:spinal   Surgeon: Agata Beach   Assistant Surgeon:Annette Juárez RN   Specimen/Tissue Removed: placenta, cord blood and bilateral tubal segemetns   Estimated Blood Loss: 650 cc  Complications: right stable hematoma  Findings:twin girls, A in vertex position with apgars 9/9 and B in sangita breech position with apgars 8/9  Normal appearing R tube and no left tube and normal ovaries bilaterally.    TECHNIQUE:  The patient was taken into the Operating Room with IV fluids running. She was given pre-operative antibiotics and spinal was found to be adequate.  She was then prepared and draped and in a normal sterile fashion.  A Pfannenstiel skin incision was made though the previous incision approximately 2 cm above the pubic bone.  Incision was started with the scalpel then taken down to underlying layer of adipose tissue to the fascia with the Bovie.   The fascia was incised in the midline then extended superiorly and laterally with the pickups and curved mayos. Superior aspect of the incision was grasped with Kocher clamps, elevated and the rectus muscles were dissected off the fascia using curved Mayos.  Similar technique was achieved in the lower abdominal incision.  The rectus muscle was then  in the midline. The periotoneal was entered sharply  Bladder blade was placed. The vesicular peritoneum was   identified  entered sharply with Metzenbaums.    Bladder flap was then created digitally. Bladder blade was replaced.  A scalpel   was used to make a low transverse incision in the lower uterine segment.   The infants head was delivered and the body followed easily. The infants nose and mouth was suctioned and the Cord  was clamped and  was handed to a waiting pediatrician.  Cord blood was obtained. Twin B was delivered in the sangita breech position with the body following easily, the arms were swept, first the left than right and the head followed easily. The nose and mouth were suctioned and the cord was cut and clamped and the baby was handed off to the awaiting nursery team. Specimen for cord blood were obtained. The placentas were then delivered with gentle manual traction and sent to pathology.   The uterus was exteriorized and cleared of all clots and debris.  The uterus was   closed with 0 Vicryl suture in a running locked fashion.  A hematoma was noted on the right side. It was watched for 10 minutes and noted to be stable.   Attention was   turned to the patient's tubes for tubal ligation.  The left tube was surgically absent. The right tube was identified. It was then grasped with the Superior approximately 3 cm from   the cornua.  An opening was made in the mesosalpinx with the Bovie and a   portion of the tube was tied off with 0 catgut suture.  The knuckle of the tube   was then cut and sent to Pathology.  Hemostasis was noted.    Posterior cul-de-sac of Adán was irrigated of all clots and debris.  The hematoma was re-inspected for another 10 minutes and noted to be stable. Uterus was then   returned to the abdomen.  The gutters were cleared of all clots and debris.    Attention was turned to the tube which were reevaluated after placement of the   uterus and it was noted to be hemostatic.  The hematoma was inspected again and noted to be stable. The peritoneum and rectus   muscles were then reapproximated using 0 Vicryl suture.  The fascia was then   closed with 0 Vicryl suture in a running fashion.  Subcutaneous tissue was then   closed with 2-0 Vicryl suture in a running fashion.  The skin was then closed   with 4-0 Monocryl suture in a running fashion.     The patient tolerated the procedure well.  Sponge,  lap and needle counts were   correct x3.   She was taken to the PACU in stable condition.

## 2018-09-24 NOTE — ANESTHESIA PROCEDURE NOTES
Spinal    Diagnosis: IUP  Patient location during procedure: OR  Start time: 9/24/2018 12:38 PM  Timeout: 9/24/2018 12:38 PM  End time: 9/24/2018 12:40 PM  Staffing  Anesthesiologist: Ace Louise MD  Resident/CRNA: Risa Haynes MD  Performed: resident/CRNA   Preanesthetic Checklist  Completed: patient identified, site marked, surgical consent, pre-op evaluation, timeout performed, IV checked, risks and benefits discussed and monitors and equipment checked  Spinal Block  Patient position: sitting  Prep: ChloraPrep  Patient monitoring: heart rate, cardiac monitor and continuous pulse ox  Approach: midline  Location: L3-4  Injection technique: single shot  CSF Fluid: clear free-flowing CSF  Needle  Needle type: pencil-tip   Needle gauge: 25 G  Needle length: 3.5 in  Additional Documentation: no paresthesia on injection and negative aspiration for heme  Needle localization: anatomical landmarks  Assessment  Sensory level: T4   Dermatomal levels determined by pinch or prick  Ease of block: easy  Patient's tolerance of the procedure: comfortable throughout block and no complaints  Additional Notes  Bupivacaine 0.75% 1.5cc  Fentanyl 10mcg  duramorph 200 mcg

## 2018-09-24 NOTE — L&D DELIVERY NOTE
[facility]   Section   Operative Note    SUMMARY     Date of Procedure: 2018     Operative Note     Date: 2018  Preoperative Diagnosis: 1. IUP at 35 wga 2. Di-di twins 3. IUGR, elevated dopplers and oligohydramniosis of twin B 4. H/o  5. Anemia 6. Unwanted fertility 7. AMA  Postoperative Diagnosis: s/p RLTCD with BTL   Procedure:RLTCD with BTL   Type of Anesthesia:spinal   Surgeon: Agata Beach   Assistant Surgeon:Annette Juárez RN   Specimen/Tissue Removed: placenta, cord blood and bilateral tubal segemetns   Estimated Blood Loss: 650 cc  Complications: right stable hematoma  Findings:twin girls, A in vertex position with apgars 9/9 and B in sangita breech position with apgars 8/9  Normal appearing R tube and no left tube and normal ovaries bilaterally.     TECHNIQUE:  The patient was taken into the Operating Room with IV fluids running. She was given pre-operative antibiotics and spinal was found to be adequate.  She was then prepared and draped and in a normal sterile fashion.  A Pfannenstiel skin incision was made though the previous incision approximately 2 cm above the pubic bone.  Incision was started with the scalpel then taken down to underlying layer of adipose tissue to the fascia with the Bovie.   The fascia was incised in the midline then extended superiorly and laterally with the pickups and curved mayos. Superior aspect of the incision was grasped with Kocher clamps, elevated and the rectus muscles were dissected off the fascia using curved Mayos.  Similar technique was achieved in the lower abdominal incision.  The rectus muscle was then  in the midline. The periotoneal was entered sharply  Bladder blade was placed. The vesicular peritoneum was   identified  entered sharply with Metzenbaums.    Bladder flap was then created digitally. Bladder blade was replaced.  A scalpel   was used to make a low transverse incision in the lower uterine segment.    The infants head was delivered and the body followed easily. The infants nose and mouth was suctioned and the Cord was clamped and  was handed to a waiting pediatrician.  Cord blood was obtained. Twin B was delivered in the sangita breech position with the body following easily, the arms were swept, first the left than right and the head followed easily. The nose and mouth were suctioned and the cord was cut and clamped and the baby was handed off to the awaiting nursery team. Specimen for cord blood were obtained. The placentas were then delivered with gentle manual traction and sent to pathology.   The uterus was exteriorized and cleared of all clots and debris.  The uterus was   closed with 0 Vicryl suture in a running locked fashion.  A hematoma was noted on the right side. It was watched for 10 minutes and noted to be stable.   Attention was   turned to the patient's tubes for tubal ligation.  The left tube was surgically absent. The right tube was identified. It was then grasped with the [place] approximately 3 cm from   the cornua.  An opening was made in the mesosalpinx with the Bovie and a   portion of the tube was tied off with 0 catgut suture.  The knuckle of the tube   was then cut and sent to Pathology.  Hemostasis was noted.    Posterior cul-de-sac of Adán was irrigated of all clots and debris.  The hematoma was re-inspected for another 10 minutes and noted to be stable. Uterus was then   returned to the abdomen.  The gutters were cleared of all clots and debris.    Attention was turned to the tube which were reevaluated after placement of the   uterus and it was noted to be hemostatic.  The hematoma was inspected again and noted to be stable. The peritoneum and rectus   muscles were then reapproximated using 0 Vicryl suture.  The fascia was then   closed with 0 Vicryl suture in a running fashion.  Subcutaneous tissue was then   closed with 2-0 Vicryl suture in a running fashion.  The skin  was then closed   with 4-0 Monocryl suture in a running fashion.     The patient tolerated the procedure well.  Sponge, lap and needle counts were   correct x3.   She was taken to the PACU in stable condition.                    Specimens:   Specimen (12h ago, onward)    None          Condition: Good    Disposition: PACU - hemodynamically stable.    Attestation: Jacob            Pedro Gann [[id number]]     Delivery Information for  Pedro Gann    Birth information:  YOB: 2020   Time of birth: 1:03 PM   Sex: female   Head Delivery Date/Time:     Delivery type:    Gestational Age: 35w2d    Delivery Providers    Delivering clinician:             Measurements    Weight:    Length:           Apgars    Living status:    Apgars:   1 min.:   5 min.:   10 min.:   15 min.:   20 min.:     Skin color:          Heart rate:          Reflex irritability:          Muscle tone:          Respiratory effort:          Total:                                 Interventions/Resuscitation         Cord    No data filed        Placenta    Placenta delivery date/time:    Placenta removal:             Labor Events:       labor:       Labor Onset Date/Time:         Dilation Complete Date/Time:         Start Pushing Date/Time:       Rupture Date/Time:              Rupture type:           Fluid Amount:        Fluid Color:        Fluid Odor:        Membrane Status (PeriCalm):        Rupture Date/Time (PeriCalm):        Fluid Amount (PeriCalm):        Fluid Color (PeriCalm):         steroids:       Antibiotics given for GBS:       Induction:       Indications for induction:        Augmentation:       Indications for augmentation:       Labor complications:       Additional complications:          Cervical ripening:                     Delivery:      Episiotomy:       Indication for Episiotomy:       Perineal Lacerations:   Repaired:      Periurethral Laceration:   Repaired:     Labial Laceration:   Repaired:      Sulcus Laceration:   Repaired:     Vaginal Laceration:   Repaired:     Cervical Laceration:   Repaired:     Repair suture:       Repair # of packets:       Vaginal delivery QBL (mL):        QBL (mL): 0     Combined Blood Loss (mL): 0     Vaginal Sweep Performed:       Surgicount Correct:         Other providers:            Details (if applicable):  Trial of Labor      Categorization:      Priority:     Indications for :     Incision Type:       Additional  information:  Forceps:    Vacuum:    Breech:    Observed anomalies    Other (Comments):            TEO Gann [[id number]]     Delivery Information for TEO Gnan    Birth information:  YOB: 2020   Time of birth: 1:05 PM   Sex: female   Head Delivery Date/Time:     Delivery type:    Gestational Age: 35w2d    Delivery Providers    Delivering clinician:             Measurements    Weight:    Length:           Apgars    Living status:    Apgars:   1 min.:   5 min.:   10 min.:   15 min.:   20 min.:     Skin color:          Heart rate:          Reflex irritability:          Muscle tone:          Respiratory effort:          Total:                                 Interventions/Resuscitation         Cord    No data filed        Placenta    Placenta delivery date/time:    Placenta removal:             Labor Events:       labor:       Labor Onset Date/Time:         Dilation Complete Date/Time:         Start Pushing Date/Time:       Rupture Date/Time:              Rupture type:           Fluid Amount:        Fluid Color:        Fluid Odor:        Membrane Status (PeriCalm):        Rupture Date/Time (PeriCalm):        Fluid Amount (PeriCalm):        Fluid Color (PeriCalm):         steroids:       Antibiotics given for GBS:       Induction:       Indications for induction:        Augmentation:       Indications for augmentation:       Labor complications:       Additional  complications:          Cervical ripening:                     Delivery:      Episiotomy:       Indication for Episiotomy:       Perineal Lacerations:   Repaired:      Periurethral Laceration:   Repaired:     Labial Laceration:   Repaired:     Sulcus Laceration:   Repaired:     Vaginal Laceration:   Repaired:     Cervical Laceration:   Repaired:     Repair suture:       Repair # of packets:       Vaginal delivery QBL (mL):        QBL (mL): 0     Combined Blood Loss (mL): 0     Vaginal Sweep Performed:       Surgicount Correct:         Other providers:            Details (if applicable):  Trial of Labor      Categorization:      Priority:     Indications for :     Incision Type:       Additional  information:  Forceps:    Vacuum:    Breech:    Observed anomalies    Other (Comments):

## 2018-09-24 NOTE — ANESTHESIA PREPROCEDURE EVALUATION
2018  Loulou Gann is a 33 y.o., female here for repeat  with Di-di twins    Past Medical History:   Diagnosis Date    Dichorionic diamniotic twin pregnancy in first trimester 3/14/2018    Dichorionic diamniotic twin pregnancy in third trimester 3/14/2018     Past Surgical History:   Procedure Laterality Date    OOPHORECTOMY       Review of patient's allergies indicates:  No Known Allergies        Anesthesia Evaluation    I have reviewed the Patient Summary Reports.     I have reviewed the Medications.     Review of Systems  Anesthesia Hx:  No problems with previous Anesthesia    Social:  Non-Smoker    Hematology/Oncology:     Oncology Normal    -- Anemia:   EENT/Dental:EENT/Dental Normal   Cardiovascular:  Cardiovascular Normal Exercise tolerance: good     Pulmonary:  Pulmonary Normal    Renal/:  Renal/ Normal     Hepatic/GI:  Hepatic/GI Normal    OB/GYN/PEDS:  Prev     Current preg w/ twins   Neurological:  Neurology Normal    Endocrine:  Endocrine Normal    Psych:  Psychiatric Normal           Physical Exam  General:  Well nourished    Airway/Jaw/Neck:  Airway Findings: Mouth Opening: Normal Tongue: Normal  General Airway Assessment: Adult  Mallampati: III  TM Distance: Normal, at least 6 cm        Eyes/Ears/Nose:  EYES/EARS/NOSE FINDINGS: Normal   Dental:  DENTAL FINDINGS: Normal   Chest/Lungs:  Chest/Lungs Clear    Heart/Vascular:  Heart Findings: Normal Heart murmur: negative    Abdomen:  Abdomen Findings: (gravid)       Mental Status:  Mental Status Findings: Normal      Lab Results   Component Value Date    WBC 7.68 2018    HGB 8.5 (L) 2018    HCT 28.3 (L) 2018    MCV 74 (L) 2018     2018           Anesthesia Plan  Type of Anesthesia, risks & benefits discussed:  Anesthesia Type:  CSE, spinal  Patient's Preference:   Intra-op  Monitoring Plan: standard ASA monitors  Intra-op Monitoring Plan Comments:   Post Op Pain Control Plan: intrathecal opioid  Post Op Pain Control Plan Comments:   Induction:    Beta Blocker:  Patient is not currently on a Beta-Blocker (No further documentation required).       Informed Consent: Patient understands risks and agrees with Anesthesia plan.  Questions answered. Anesthesia consent signed with patient.  ASA Score: 2     Day of Surgery Review of History & Physical: I have interviewed and examined the patient. I have reviewed the patient's H&P dated:  There are no significant changes.          Ready For Surgery From Anesthesia Perspective.

## 2018-09-24 NOTE — TRANSFER OF CARE
"Anesthesia Transfer of Care Note    Patient: Loulou Gann    Procedure(s) Performed: Procedure(s) (LRB):   SECTION (N/A)    Patient location: Labor and Delivery    Anesthesia Type: spinal    Transport from OR: Transported from OR on room air with adequate spontaneous ventilation    Post pain: adequate analgesia    Post assessment: no apparent anesthetic complications    Post vital signs: stable    Level of consciousness: awake, alert and oriented    Nausea/Vomiting: no nausea/vomiting    Complications: none    Transfer of care protocol was followed      Last vitals:   2018 1352    /56  Pulse 85   Sat 99%  RR 15  Temp 97.5    Visit Vitals  Ht 5' 3" (1.6 m)   Wt 83.9 kg (185 lb)   LMP 2018   BMI 32.77 kg/m²     "

## 2018-09-25 LAB
ANISOCYTOSIS BLD QL SMEAR: ABNORMAL
BASOPHILS # BLD AUTO: ABNORMAL K/UL
BASOPHILS NFR BLD: 0 %
DIFFERENTIAL METHOD: ABNORMAL
EOSINOPHIL # BLD AUTO: ABNORMAL K/UL
EOSINOPHIL NFR BLD: 1 %
ERYTHROCYTE [DISTWIDTH] IN BLOOD BY AUTOMATED COUNT: 16.8 %
HCT VFR BLD AUTO: 31.2 %
HGB BLD-MCNC: 9.5 G/DL
HYPOCHROMIA BLD QL SMEAR: ABNORMAL
LYMPHOCYTES # BLD AUTO: ABNORMAL K/UL
LYMPHOCYTES NFR BLD: 9 %
MCH RBC QN AUTO: 22.3 PG
MCHC RBC AUTO-ENTMCNC: 30.4 G/DL
MCV RBC AUTO: 73 FL
MONOCYTES # BLD AUTO: ABNORMAL K/UL
MONOCYTES NFR BLD: 9 %
MYELOCYTES NFR BLD MANUAL: 1 %
NEUTROPHILS NFR BLD: 78 %
NEUTS BAND NFR BLD MANUAL: 2 %
NRBC BLD-RTO: ABNORMAL /100 WBC
PLATELET # BLD AUTO: 137 K/UL
PMV BLD AUTO: ABNORMAL FL
POIKILOCYTOSIS BLD QL SMEAR: SLIGHT
POLYCHROMASIA BLD QL SMEAR: ABNORMAL
RBC # BLD AUTO: 4.26 M/UL
WBC # BLD AUTO: 15.07 K/UL

## 2018-09-25 PROCEDURE — 25000003 PHARM REV CODE 250: Performed by: OBSTETRICS & GYNECOLOGY

## 2018-09-25 PROCEDURE — 11000001 HC ACUTE MED/SURG PRIVATE ROOM

## 2018-09-25 PROCEDURE — 85007 BL SMEAR W/DIFF WBC COUNT: CPT

## 2018-09-25 PROCEDURE — 85027 COMPLETE CBC AUTOMATED: CPT

## 2018-09-25 PROCEDURE — 36415 COLL VENOUS BLD VENIPUNCTURE: CPT

## 2018-09-25 RX ADMIN — IBUPROFEN 800 MG: 400 TABLET ORAL at 09:09

## 2018-09-25 RX ADMIN — FERROUS SULFATE TAB EC 325 MG (65 MG FE EQUIVALENT) 325 MG: 325 (65 FE) TABLET DELAYED RESPONSE at 08:09

## 2018-09-25 RX ADMIN — OXYCODONE HYDROCHLORIDE AND ACETAMINOPHEN 1 TABLET: 10; 325 TABLET ORAL at 09:09

## 2018-09-25 RX ADMIN — FERROUS SULFATE TAB EC 325 MG (65 MG FE EQUIVALENT) 325 MG: 325 (65 FE) TABLET DELAYED RESPONSE at 09:09

## 2018-09-25 RX ADMIN — MUPIROCIN 1 G: 20 OINTMENT TOPICAL at 08:09

## 2018-09-25 RX ADMIN — DOCUSATE SODIUM 200 MG: 100 CAPSULE, LIQUID FILLED ORAL at 08:09

## 2018-09-25 RX ADMIN — DOCUSATE SODIUM 200 MG: 100 CAPSULE, LIQUID FILLED ORAL at 09:09

## 2018-09-25 RX ADMIN — IBUPROFEN 800 MG: 400 TABLET ORAL at 07:09

## 2018-09-25 RX ADMIN — OXYCODONE HYDROCHLORIDE AND ACETAMINOPHEN 1 TABLET: 10; 325 TABLET ORAL at 07:09

## 2018-09-25 RX ADMIN — SIMETHICONE CHEW TAB 80 MG 80 MG: 80 TABLET ORAL at 07:09

## 2018-09-25 RX ADMIN — OXYCODONE HYDROCHLORIDE AND ACETAMINOPHEN 1 TABLET: 10; 325 TABLET ORAL at 02:09

## 2018-09-25 RX ADMIN — MUPIROCIN 1 G: 20 OINTMENT TOPICAL at 09:09

## 2018-09-25 NOTE — LACTATION NOTE
"   09/25/18 1015   Infant Information   Infant's Name Mary Harper   Infant's Medical Care Provider [facility]   Maternal Infant Assessment   Breast Density Bilateral:;soft  (per pt.)   Pain/Comfort Assessments   Pain Assessment Performed Yes       Number Scale   Presence of Pain denies  (denies pain/redness to nipples,stated has "white" rash on br)   Location - Side Bilateral   Location nipple(s)   Pain Rating: Rest 0   Pain Rating: Activity 0  (denies pain to nipples or breast when pumping)   Maternal Infant Feeding   Maternal Preparation breast care;hand hygiene   Maternal Emotional State relaxed   Infant Positioning (babies in NICU)   Signs of Milk Transfer (mom said she still is not pumping anything yet)   Presence of Pain no  (deneis pain to nipples or breast when pumping)   Time Spent (min) 15-30 min   Comfort Measures Following Feeding air-drying encouraged;expressed milk applied   Latch Assistance other (see comments)  (enc. mom to call for assistance w/  pump. if needed)   Engorgement Measures complete emptying encouraged;supportive bra encouraged   Breastfeeding Education importance of skin-to-skin contact;increasing milk supply;other (see comments);label/storage of breast milk;milk expression, electric pump;milk expression, hand  ( pumping 8+/24hrs./cleaning of joseph. kit,/labeling/storing)   Breastfeeding History   Breastfeeding History no  (did not BF first child,now 12 yrs.)   Infant First Feeding   Skin-to-Skin Contact (enc. s2s when babies are able)   Equipment Type/Education   Pump Type Symphony   Breast Pump Type double electric, hospital grade   Breast Pump Flange Type hard   Breast Pump Flange Size 27 mm   Breast Pumping Bilateral Breasts:  (enc. to pump min.of 15mins.or 5mins. after last drop)   Pumping Frequency (times) (to pump 8+/24hrs,once at night,w/br mass/comp &hand exp)   Lactation Referrals   Lactation Consult Follow up;Knowledge deficit;Pump teaching   Lactation " Interventions   Attachment Promotion counseling provided;environment adjusted;family involvement promoted;privacy provided;role responsibility promoted;skin-to-skin contact encouraged   Breastfeeding Assistance electric breast pump used;milk expression/pumping;support offered   Maternal Breastfeeding Support diary/feeding log utilized;encouragement offered;lactation counseling provided

## 2018-09-25 NOTE — PLAN OF CARE
Problem: Patient Care Overview  Goal: Plan of Care Review  Outcome: Ongoing (interventions implemented as appropriate)  POC reviewed with patient. Pt tolerating regular diet, ambulating independently/ visiting babies in NICU, and voiding spontaneously. VS remain stable. Afebrile. Pain being well controlled with ordered medications. No acute distress noted. Will continue to monitor.

## 2018-09-25 NOTE — LACTATION NOTE
09/24/18 1855   Maternal Infant Assessment   Breast Density Bilateral:;soft   Areola Bilateral:;elastic;other (see comments)  (white appearance (rash)to outer areola bilat;informed RN)   Nipple(s) Bilateral:;everted   Nipple Width Bilateral:  (slightly large )   Breasts WDL   Breasts WDL WDL   Pain/Comfort Assessments   Pain Assessment Performed Yes       Number Scale   Presence of Pain denies   Location - Side Bilateral   Location nipple(s)   Maternal Infant Feeding   Maternal Preparation breast care;hand hygiene   Maternal Emotional State assist needed;relaxed   Presence of Pain no   Breast Milk Supply Volume (ml) (no colostrum visible at this time)   Time Spent (min) 30-60 min   Breastfeeding Education adequate milk volume;increasing milk supply;label/storage of breast milk;milk expression, electric pump;milk expression, hand   Breastfeeding History   Currently Breastfeeding no   Breastfeeding History no   Equipment Type/Education   Pump Type Symphony   Breast Pump Type double electric, hospital grade   Breast Pump Flange Type hard   Breast Pump Flange Size 27 mm   Breast Pumping Bilateral Breasts:;pumped until emptied   Pumping Frequency (times) (enc to pump/hand express 8+ times/24hrs)   Lactation Referrals   Lactation Consult Breast/nipple pain;Initial assessment;Knowledge deficit;Pump teaching   Lactation Interventions   Breastfeeding Assistance electric breast pump used;milk expression/pumping;support offered   Maternal Breastfeeding Support diary/feeding log utilized;encouragement offered;lactation counseling provided;maternal rest encouraged

## 2018-09-25 NOTE — PLAN OF CARE
Problem: Patient Care Overview  Goal: Plan of Care Review  Outcome: Ongoing (interventions implemented as appropriate)  POC reviewed with patient. Patient tolerating clear diet and PO meds, will advance in AM. Awaiting escalante removal at 0600. VSS afebrile. Pain well controlled with prescribed medications. No distress noted. Will continue to monitor.

## 2018-09-25 NOTE — PHYSICIAN QUERY
"PT Name: Loulou Gann  MR #: [id number]    Physician Query Form - Hematology Clarification      CDS/: ROCKY Manuel,[email or web address]            Contact information:[email or web address]    This form is a permanent document in the medical record.      Query Date: September 25, 2018    By submitting this query, we are merely seeking further clarification of documentation. Please utilize your independent clinical judgment when addressing the question(s) below.    The Medical record contains the following:   Indicators  Supporting Clinical Findings Location in Medical Record   X "Anemia" documented Anemia: discussed with patient depending on EBL may need blood transfusion   H&P 9/24   X H & H = Hgb=7.9-9.5  Hct=26.5-31.2 LAB 9/24-9/25    BP =                     HR=      "GI bleeding" documented     X Acute bleeding (Non GI site) Estimated Blood Loss: 650 cc  Complications: right stable hematoma   Op note 9/24    Transfusion(s)     X Treatment: ferrous sulfate EC tablet 325 mg   MAR 9/24   X Other Procedure:RLTCD with BTL    Op note 9/24     Provider, please specify diagnosis or diagnoses associated with above clinical findings.    [ x ] Acute blood loss anemia expected post-operatively  [  ] Acute blood loss anemia  [  ] Iron deficiency anemia  [  ] Other Hematological Diagnosis (please specify): _________________________________  [  ] Clinically Undetermined       Please document in your progress notes daily for the duration of treatment, until resolved, and include in your discharge summary.                                                                                                      "

## 2018-09-25 NOTE — PROGRESS NOTES
[facility]  Obstetrics  Postpartum Progress Note    Patient Name: Loulou Gann  MRN: [id number]  Admission Date: 2018  Hospital Length of Stay: 1 days  Attending Physician: Agata Beach MD  Primary Care Provider: Primary Doctor No    Subjective:     Principal Problem:H/O:     Hospital course:   Loulou Gann is a 33 y.o. female POD #1 status post Repeat  section/tubal ligation. Patient is  doing well this morning. She denies nausea, vomiting, fever or chills. Patient reports mild abdominal pain that is well relieved by IV and PO pain medications. Lochia is mild and decreasing. Pt s/p escalante removal this AM. Has not ambulated or voided. She has not passed flatus,and has not had BM. Discussed AM labs- H/H stable. Pt without signs of symptoms of anemia. Has not yet ambulated though so given precautions.      Objective:     Vital Signs (Most Recent):  Temp: 97.7 °F (36.5 °C) (18 0800)  Pulse: 70 (18 0800)  Resp: 18 (18 0800)  BP: 128/73 (18 0800)  SpO2: 99 % (18 0800) Vital Signs (24h Range):  Temp:  [97.6 °F (36.4 °C)-98.4 °F (36.9 °C)] 97.7 °F (36.5 °C)  Pulse:  [58-91] 70  Resp:  [15-18] 18  SpO2:  [86 %-100 %] 99 %  BP: (115-133)/(68-91) 128/73     Weight: 83.9 kg (185 lb)  Body mass index is 32.77 kg/m².      Intake/Output Summary (Last 24 hours) at 2018 1124  Last data filed at 2018 0800  Gross per 24 hour   Intake 2000 ml   Output 3043 ml   Net -1043 ml       Physical Exam:   Constitutional: She is oriented to person, place, and time. She appears well-developed and well-nourished. No distress.    HENT:   Head: Normocephalic and atraumatic.      Cardiovascular: Normal rate, regular rhythm and normal heart sounds.     Pulmonary/Chest: Effort normal and breath sounds normal. She has no wheezes. She has no rales.        Abdominal: Soft. Bowel sounds are normal.   Fundus firm. Aquacel dressing in place- c/d/i             Musculoskeletal: Moves  all extremeties. She exhibits no edema.       Neurological: She is alert and oriented to person, place, and time. She has normal reflexes.    Skin: Skin is warm and dry.    Psychiatric: She has a normal mood and affect.       Significant Labs:  Lab Results   Component Value Date    GROUPTRH O POS 2018    HEPBSAG Negative 2018     Recent Labs   Lab  18   0627   HGB  9.5*   HCT  31.2*       I have personallly reviewed all pertinent lab results from the last 24 hours.    Assessment/Plan:     33 y.o. female  at 35w2d for:    Active Diagnoses:    Diagnosis Date Noted POA    PRINCIPAL PROBLEM:  H/O:  [Z98.891] 2018 Not Applicable    Twin pregnancy, twins dichorionic and diamniotic [O30.049] 2018 Yes    History of bilateral tubal ligation [Z98.51] 2018 Not Applicable      Problems Resolved During this Admission:     1. Postpartum care:  - Patient doing well. Continue routine management and advances.  - Continue PO pain meds. Pain well controlled.  - Encourage ambulation  - Lactation - consult as needed    2. Anemia  -stable postop. Continue to monitor for symptoms of anemia. Restart iron.     Disposition: As patient meets milestones, will plan to discharge.    Jana De Oliveira MD  Obstetrics  [facility]

## 2018-09-25 NOTE — CARE UPDATE
Preparing pt for transfer to [facility]. VSS, NAD. Malia care performed, gown changed. Lira catheter emptied 1500ml clear yellow urine emptied. Pt denies any pain at this time. New bag LR hung at 125/hr.

## 2018-09-25 NOTE — ANESTHESIA POSTPROCEDURE EVALUATION
"Anesthesia Post Evaluation    Patient: Loulou Gann    Procedure(s) Performed: Procedure(s) (LRB):   SECTION (N/A)    Final Anesthesia Type: spinal  Patient location during evaluation: labor & delivery  Patient participation: Yes- Able to Participate  Level of consciousness: awake and alert and oriented  Post-procedure vital signs: reviewed and stable  Pain management: adequate  Airway patency: patent  PONV status at discharge: No PONV  Anesthetic complications: no      Cardiovascular status: blood pressure returned to baseline and hemodynamically stable  Respiratory status: unassisted and spontaneous ventilation  Hydration status: euvolemic  Follow-up not needed.      No catheter in back  No headache/neckache/backache  Full return of neurological function  Able to urinate  Advised patient to report any new problems of back pain, especially with fever or decreasing bladder function occurring during coming days to weeks        Visit Vitals  /81   Pulse 60   Temp 36.9 °C (98.4 °F) (Oral)   Resp 16   Ht 5' 3" (1.6 m)   Wt 83.9 kg (185 lb)   LMP 2018   SpO2 99%   Breastfeeding? Yes   BMI 32.77 kg/m²       Pain/Enrique Score: Pain Rating Prior to Med Admin: 4 (2018  9:00 PM)  Pain Rating Post Med Admin: 0 (2018 10:00 PM)        "

## 2018-09-25 NOTE — PLAN OF CARE
Problem: Patient Care Overview  Goal: Plan of Care Review  Outcome: Ongoing (interventions implemented as appropriate)  Mom will continue to pump 8 or more times in 24hrs., once at night.  She will do breast massage before pumping, during and after pumping and will end with hand expression.  She will clean collection kit as instructed, and label, store and transport EBM safely.    She will follow good hand hygiene as instructed.  She will breastfeed baby with skin to skin frequently and on cue when visiting, when baby is ready.  If baby can not breastfeed she will pump at baby's bedside while visiting.  She will monitor for signs of an adequate feeding/adequate milk supply.  She will follow up with pediatrician as instructed.  She will call [facility] for any needs or concerns.

## 2018-09-25 NOTE — PLAN OF CARE
Problem: Patient Care Overview  Goal: Plan of Care Review  Outcome: Ongoing (interventions implemented as appropriate)  Mom will pump/hand express at least 8+ times/24 hrs for  nicu twins. Symphony pump set up at bs. Instructed on use/cleaning. Stressed importance of hand hygiene & keeping pump kit clean. Will collect, label, store & transport EBM as instructed. Assisted with first pumping. Dad at bs helping mom to hold pumping horns in place. Benefits of BR & providing EBM for  babies discussed. Praise provided for pumping. Mild whitish rash noted to outer areola bilat. Denies any pain, itching, or open sores anywhere. Has not mentioned it to MD-encouraged to do so when rounding. Informed Amarilis & Jeri. Will call for any needs.

## 2018-09-26 PROBLEM — O30.049 TWIN PREGNANCY, TWINS DICHORIONIC AND DIAMNIOTIC: Status: RESOLVED | Noted: 2018-09-24 | Resolved: 2018-09-26

## 2018-09-26 LAB
ALBUMIN SERPL BCP-MCNC: 1.7 G/DL
ALP SERPL-CCNC: 203 U/L
ALT SERPL W/O P-5'-P-CCNC: 6 U/L
ANION GAP SERPL CALC-SCNC: 7 MMOL/L
ANISOCYTOSIS BLD QL SMEAR: ABNORMAL
AST SERPL-CCNC: 20 U/L
BASOPHILS # BLD AUTO: 0.04 K/UL
BASOPHILS NFR BLD: 0.4 %
BILIRUB SERPL-MCNC: 0.3 MG/DL
BUN SERPL-MCNC: 9 MG/DL
CALCIUM SERPL-MCNC: 8 MG/DL
CHLORIDE SERPL-SCNC: 110 MMOL/L
CO2 SERPL-SCNC: 22 MMOL/L
CREAT SERPL-MCNC: 0.8 MG/DL
DIFFERENTIAL METHOD: ABNORMAL
EOSINOPHIL # BLD AUTO: 0.2 K/UL
EOSINOPHIL NFR BLD: 1.6 %
ERYTHROCYTE [DISTWIDTH] IN BLOOD BY AUTOMATED COUNT: 17.2 %
EST. GFR  (AFRICAN AMERICAN): >60 ML/MIN/1.73 M^2
EST. GFR  (NON AFRICAN AMERICAN): >60 ML/MIN/1.73 M^2
GIANT PLATELETS BLD QL SMEAR: PRESENT
GLUCOSE SERPL-MCNC: 80 MG/DL
HCT VFR BLD AUTO: 27.5 %
HGB BLD-MCNC: 8.3 G/DL
HYPOCHROMIA BLD QL SMEAR: ABNORMAL
LYMPHOCYTES # BLD AUTO: 1.6 K/UL
LYMPHOCYTES NFR BLD: 15.6 %
MCH RBC QN AUTO: 22.4 PG
MCHC RBC AUTO-ENTMCNC: 30.2 G/DL
MCV RBC AUTO: 74 FL
MONOCYTES # BLD AUTO: 1.1 K/UL
MONOCYTES NFR BLD: 11.2 %
NEUTROPHILS # BLD AUTO: 7 K/UL
NEUTROPHILS NFR BLD: 71.2 %
PLATELET # BLD AUTO: 156 K/UL
PLATELET BLD QL SMEAR: ABNORMAL
PMV BLD AUTO: 10.5 FL
POIKILOCYTOSIS BLD QL SMEAR: SLIGHT
POLYCHROMASIA BLD QL SMEAR: ABNORMAL
POTASSIUM SERPL-SCNC: 4.1 MMOL/L
PROT SERPL-MCNC: 5.1 G/DL
RBC # BLD AUTO: 3.71 M/UL
SODIUM SERPL-SCNC: 139 MMOL/L
WBC # BLD AUTO: 10.09 K/UL

## 2018-09-26 PROCEDURE — 11000001 HC ACUTE MED/SURG PRIVATE ROOM

## 2018-09-26 PROCEDURE — 36415 COLL VENOUS BLD VENIPUNCTURE: CPT

## 2018-09-26 PROCEDURE — 85025 COMPLETE CBC W/AUTO DIFF WBC: CPT

## 2018-09-26 PROCEDURE — 80053 COMPREHEN METABOLIC PANEL: CPT

## 2018-09-26 PROCEDURE — 25000003 PHARM REV CODE 250: Performed by: OBSTETRICS & GYNECOLOGY

## 2018-09-26 RX ORDER — IBUPROFEN 800 MG/1
800 TABLET ORAL EVERY 8 HOURS PRN
Qty: 60 TABLET | Refills: 0 | Status: SHIPPED | OUTPATIENT
Start: 2018-09-26

## 2018-09-26 RX ORDER — OXYCODONE AND ACETAMINOPHEN 5; 325 MG/1; MG/1
1 TABLET ORAL EVERY 4 HOURS PRN
Qty: 30 TABLET | Refills: 0 | Status: SHIPPED | OUTPATIENT
Start: 2018-09-26 | End: 2018-11-07

## 2018-09-26 RX ADMIN — OXYCODONE HYDROCHLORIDE AND ACETAMINOPHEN 1 TABLET: 10; 325 TABLET ORAL at 05:09

## 2018-09-26 RX ADMIN — MUPIROCIN 1 G: 20 OINTMENT TOPICAL at 10:09

## 2018-09-26 RX ADMIN — FERROUS SULFATE TAB EC 325 MG (65 MG FE EQUIVALENT) 325 MG: 325 (65 FE) TABLET DELAYED RESPONSE at 09:09

## 2018-09-26 RX ADMIN — IBUPROFEN 800 MG: 400 TABLET ORAL at 05:09

## 2018-09-26 RX ADMIN — OXYCODONE HYDROCHLORIDE AND ACETAMINOPHEN 1 TABLET: 10; 325 TABLET ORAL at 01:09

## 2018-09-26 RX ADMIN — DOCUSATE SODIUM 200 MG: 100 CAPSULE, LIQUID FILLED ORAL at 10:09

## 2018-09-26 RX ADMIN — IBUPROFEN 800 MG: 400 TABLET ORAL at 01:09

## 2018-09-26 RX ADMIN — OXYCODONE HYDROCHLORIDE AND ACETAMINOPHEN 1 TABLET: 10; 325 TABLET ORAL at 09:09

## 2018-09-26 RX ADMIN — FERROUS SULFATE TAB EC 325 MG (65 MG FE EQUIVALENT) 325 MG: 325 (65 FE) TABLET DELAYED RESPONSE at 10:09

## 2018-09-26 RX ADMIN — DOCUSATE SODIUM 200 MG: 100 CAPSULE, LIQUID FILLED ORAL at 09:09

## 2018-09-26 RX ADMIN — MUPIROCIN 1 G: 20 OINTMENT TOPICAL at 09:09

## 2018-09-26 NOTE — PLAN OF CARE
Problem: Patient Care Overview  Goal: Plan of Care Review  Outcome: Ongoing (interventions implemented as appropriate)  Mother  from her baby. Mother encouraged to provide breastmilk by pumping. Benefits of breastmilk discussed. Breastpump initiated. Mother educated on pump use, cleaning pump parts, labeling containers and storage of breastmilk. Mother to call her nurse with further questions. Preparation and Hygiene:  1. Shower daily.  2. Wear a clean bra each day and wash daily in warm soapy water.  3. Change wet or moist breast pads frequently.  Moist pads can promote growth of germs.  Actively wash your hands, paying close attention to the area around and under your fingernails, thoroughly with soap and water for 15 seconds before pumping or handling your milk.  Re-wash your hands if you touch anything (scratching your nose, answering the phone, etc) while pumping or handling your milk.   4. Before pumping your breasts, assemble the pump collection kit and have ready the sterile container and labels.  Place these items on a clean surface next to the breast pump.  5. Each time after you have finished pumping, take apart all of the parts of the breast pump collection kit and place them in a separate cleaning container (do not place them in the sink).  Be sure to remove the yellow valve from the breast shield and separate the white membrane from the yellow valve.  Rinse all of these parts with cool water.  Then use a new sponge and/or bottle brush and dishwashing detergent to clean the parts.  Rinse off the soapy water with cool water and air dry on a clean towel covered with a clean cloth.  All parts may also be washed after each use in the top rack of a .  6. Once each day, sanitize all of the parts of the breast pump collection kit.  This can be done by boiling the kit parts for 10 minutes or by using a Quick Clean Micro-Steam Bag made by [employer]  7. If condensation appears in the tubing,  continue to run the pump with the tubing attached for 1-2 minutes or until the tubing is dry.   8. Notify your babys nurse or doctor if you become ill or need to take any medication, even over-the-counter medicines.  Collection and Storage of Expressed Breast milk:         1. Pump your breasts at least 8 or more times every 24 hours.  Double pump (both breasts at the same time) for at least 15-20 minutes using the most suction that is comfortable.    2. Write the date and time of pumping and the name of any medications you are taking on the babys pre-printed hospital identification label.   3.    Do not touch the inside of the storage containers or lids.  4.        Tightly screw the lid onto the container and place immediately into the refrigerator for daily use.  5.    Expressed breast milk should be refrigerated or frozen within 4 hours of pumping.  6.        Do not store expressed breast milk on the door of your refrigerator or freezer             where the temperature is warmer.   7.        Refrigerated milk may be stored for up to 7 days.  At this point it can be moved to the freezer for 6 -12 months.  8.        Thaw frozen breast milk in overnight in the refrigerator.  Once milk is thawed it must be used within 24 hours.  9.        Refrigerated breast milk needs to be warmed to room temperature.  Warm by leaving unrefrigerated until it reached room temperature, or place sealed bottle into a cup of warm (not boiling) water or use a bottle warmer.               Never warm breast milk in a microwave or boiling water.    For any questions or concerns call the Lactation Department [phone]

## 2018-09-26 NOTE — LACTATION NOTE
Problem: Patient Care Overview  Goal: Plan of Care Review  Outcome: Ongoing (interventions implemented as appropriate)  Mother  from her baby. Mother encouraged to provide breastmilk by pumping. Benefits of breastmilk discussed. Breastpump initiated. Mother educated on pump use, cleaning pump parts, labeling containers and storage of breastmilk. Mother to call her nurse with further questions. Preparation and Hygiene:  1. Shower daily.  2. Wear a clean bra each day and wash daily in warm soapy water.  3. Change wet or moist breast pads frequently.  Moist pads can promote growth of germs.  Actively wash your hands, paying close attention to the area around and under your fingernails, thoroughly with soap and water for 15 seconds before pumping or handling your milk.  Re-wash your hands if you touch anything (scratching your nose, answering the phone, etc) while pumping or handling your milk.   4. Before pumping your breasts, assemble the pump collection kit and have ready the sterile container and labels.  Place these items on a clean surface next to the breast pump.  5. Each time after you have finished pumping, take apart all of the parts of the breast pump collection kit and place them in a separate cleaning container (do not place them in the sink).  Be sure to remove the yellow valve from the breast shield and separate the white membrane from the yellow valve.  Rinse all of these parts with cool water.  Then use a new sponge and/or bottle brush and dishwashing detergent to clean the parts.  Rinse off the soapy water with cool water and air dry on a clean towel covered with a clean cloth.  All parts may also be washed after each use in the top rack of a .  6. Once each day, sanitize all of the parts of the breast pump collection kit.  This can be done by boiling the kit parts for 10 minutes or by using a Quick Clean Micro-Steam Bag made by [employer]  7. If condensation appears in the tubing,  continue to run the pump with the tubing attached for 1-2 minutes or until the tubing is dry.   8. Notify your babys nurse or doctor if you become ill or need to take any medication, even over-the-counter medicines.  Collection and Storage of Expressed Breast milk:         1. Pump your breasts at least 8 or more times every 24 hours.  Double pump (both breasts at the same time) for at least 15-20 minutes using the most suction that is comfortable.    2. Write the date and time of pumping and the name of any medications you are taking on the babys pre-printed hospital identification label.   3.    Do not touch the inside of the storage containers or lids.  4.        Tightly screw the lid onto the container and place immediately into the refrigerator for daily use.  5.    Expressed breast milk should be refrigerated or frozen within 4 hours of pumping.  6.        Do not store expressed breast milk on the door of your refrigerator or freezer             where the temperature is warmer.   7.        Refrigerated milk may be stored for up to 7 days.  At this point it can be moved to the freezer for 6 -12 months.  8.        Thaw frozen breast milk in overnight in the refrigerator.  Once milk is thawed it must be used within 24 hours.  9.        Refrigerated breast milk needs to be warmed to room temperature.  Warm by leaving unrefrigerated until it reached room temperature, or place sealed bottle into a cup of warm (not boiling) water or use a bottle warmer.               Never warm breast milk in a microwave or boiling water.    For any questions or concerns call the Lactation Department [phone]

## 2018-09-26 NOTE — LACTATION NOTE
18 1155   Maternal Information   Person Making Referral nurse   Maternal Reason for Referral (pumping  for NICU infants)   Infant Reason for Referral low birth weight;35-37 weeks gestation   Maternal Medical Surgical History   Surgical History yes   Surgical Procedure  section;oophorectomy   Maternal Infant Assessment   Breast Size Issue none   Breast Shape Bilateral:;pendulous   Breast Density Bilateral:;soft  (per mom)   Maternal Infant Feeding   Maternal Preparation hand hygiene;breast care   Maternal Emotional State relaxed   Time Spent (min) 0-15 min   Engorgement Measures complete emptying encouraged;supportive bra encouraged   Breastfeeding Education label/storage of breast milk;medication effects;increasing milk supply;milk expression, electric pump   Equipment Type/Education   Pumping Frequency (times) 8 # of times pumped  (encouraged pump and breast massage 8 or more in 24hr)   Lactation Referrals   Lactation Consult Follow up;Knowledge deficit;Pump teaching   Lactation Interventions   Breastfeeding Assistance milk expression/pumping   Maternal Breastfeeding Support encouragement offered;lactation counseling provided;maternal hydration promoted;maternal nutrition promoted;maternal rest encouraged

## 2018-09-26 NOTE — PROGRESS NOTES
2018  S/p RLTCT/R tubal ligation    Patient is doing well with no complaints. Tolerating Po without N/V. Passing flatus. Ambulated without difficulty. Pain controlled with pain medications. Lochia is less than menses. Is bottlefeeding.     Temp:  [97.6 °F (36.4 °C)-98.7 °F (37.1 °C)] 98.7 °F (37.1 °C)  Pulse:  [70-89] 77  Resp:  [16-18] 16  SpO2:  [98 %-100 %] 100 %  BP: (125-141)/(73-89) 129/89      In bed, NAD, RRR, CTA B, abd S/NT/ND + BS FF and below umbilicus, dressing c/d/i with small area of blood that hasn't gone outside lines ext: 1+ pitting edema or tenderness DTR 3+ but no clonus     A/P: 33 y.o.   s/p RLTCD/BTL PPD 2   With anemia and GHTN     1. Continue routine care  2. Had 2 elevated BP so technically GHTN: no clinical symptoms of pre-e. Will get pre-e labs. And monitor and recheck BP in 1 week after discharge, discussed pre-e symptoms with patient and when to come back to the hospital  3. Anemia: stable, will continue to monitor and start iron

## 2018-09-26 NOTE — PLAN OF CARE
Problem: Patient Care Overview  Goal: Plan of Care Review  Outcome: Ongoing (interventions implemented as appropriate)     Patient ambulating independently. POC reviewed with pt; able to verbalize acceptance and understanding. Questions answered/encouraged; reassurance provided. Support of significant other noted at bedside. Tolerating regular diet.  LTV incision with aquacel dressing intact with small amount of drainage noted and marked. Voiding and passing flatus.  Pain well controlled with ordered medications. Lochia rubra and light.  Twin babies in NICU.  Call light in reach, side rails up x2, bed in lowest position with wheels locked.  Remains free from falls and/or injury. VSS. NAD noted. Will continue to monitor.

## 2018-09-26 NOTE — PLAN OF CARE
Problem: Patient Care Overview  Goal: Plan of Care Review  Outcome: Ongoing (interventions implemented as appropriate)  Mother  from her baby. Mother encouraged to provide breastmilk by pumping. Benefits of breastmilk discussed. Breastpump initiated. Mother educated on pump use, cleaning pump parts, labeling containers and storage of breastmilk. Mother to call her nurse with further questions. Preparation and Hygiene:  1. Shower daily.  2. Wear a clean bra each day and wash daily in warm soapy water.  3. Change wet or moist breast pads frequently.  Moist pads can promote growth of germs.  Actively wash your hands, paying close attention to the area around and under your fingernails, thoroughly with soap and water for 15 seconds before pumping or handling your milk.  Re-wash your hands if you touch anything (scratching your nose, answering the phone, etc) while pumping or handling your milk.   4. Before pumping your breasts, assemble the pump collection kit and have ready the sterile container and labels.  Place these items on a clean surface next to the breast pump.  5. Each time after you have finished pumping, take apart all of the parts of the breast pump collection kit and place them in a separate cleaning container (do not place them in the sink).  Be sure to remove the yellow valve from the breast shield and separate the white membrane from the yellow valve.  Rinse all of these parts with cool water.  Then use a new sponge and/or bottle brush and dishwashing detergent to clean the parts.  Rinse off the soapy water with cool water and air dry on a clean towel covered with a clean cloth.  All parts may also be washed after each use in the top rack of a .  6. Once each day, sanitize all of the parts of the breast pump collection kit.  This can be done by boiling the kit parts for 10 minutes or by using a Quick Clean Micro-Steam Bag made by [employer]  7. If condensation appears in the tubing,  continue to run the pump with the tubing attached for 1-2 minutes or until the tubing is dry.   8. Notify your babys nurse or doctor if you become ill or need to take any medication, even over-the-counter medicines.  Collection and Storage of Expressed Breast milk:         1. Pump your breasts at least 8 or more times every 24 hours.  Double pump (both breasts at the same time) for at least 15-20 minutes using the most suction that is comfortable.    2. Write the date and time of pumping and the name of any medications you are taking on the babys pre-printed hospital identification label.   3.    Do not touch the inside of the storage containers or lids.  4.        Tightly screw the lid onto the container and place immediately into the refrigerator for daily use.  5.    Expressed breast milk should be refrigerated or frozen within 4 hours of pumping.  6.        Do not store expressed breast milk on the door of your refrigerator or freezer             where the temperature is warmer.   7.        Refrigerated milk may be stored for up to 7 days.  At this point it can be moved to the freezer for 6 -12 months.  8.        Thaw frozen breast milk in overnight in the refrigerator.  Once milk is thawed it must be used within 24 hours.  9.        Refrigerated breast milk needs to be warmed to room temperature.  Warm by leaving unrefrigerated until it reached room temperature, or place sealed bottle into a cup of warm (not boiling) water or use a bottle warmer.               Never warm breast milk in a microwave or boiling water.    For any questions or concerns call the Lactation Department [phone]

## 2018-09-27 VITALS
WEIGHT: 184.94 LBS | SYSTOLIC BLOOD PRESSURE: 133 MMHG | RESPIRATION RATE: 18 BRPM | BODY MASS INDEX: 32.77 KG/M2 | HEIGHT: 63 IN | HEART RATE: 70 BPM | DIASTOLIC BLOOD PRESSURE: 76 MMHG | TEMPERATURE: 99 F | OXYGEN SATURATION: 98 %

## 2018-09-27 LAB
BLD PROD TYP BPU: NORMAL
BLD PROD TYP BPU: NORMAL
BLOOD UNIT EXPIRATION DATE: NORMAL
BLOOD UNIT EXPIRATION DATE: NORMAL
BLOOD UNIT TYPE CODE: 5100
BLOOD UNIT TYPE CODE: 5100
BLOOD UNIT TYPE: NORMAL
BLOOD UNIT TYPE: NORMAL
CODING SYSTEM: NORMAL
CODING SYSTEM: NORMAL
DISPENSE STATUS: NORMAL
DISPENSE STATUS: NORMAL
NUM UNITS TRANS PACKED RBC: NORMAL
NUM UNITS TRANS PACKED RBC: NORMAL

## 2018-09-27 PROCEDURE — 99024 POSTOP FOLLOW-UP VISIT: CPT | Mod: ,,, | Performed by: OBSTETRICS & GYNECOLOGY

## 2018-09-27 PROCEDURE — 25000003 PHARM REV CODE 250: Performed by: OBSTETRICS & GYNECOLOGY

## 2018-09-27 RX ORDER — HYDRALAZINE HYDROCHLORIDE 20 MG/ML
10 INJECTION INTRAMUSCULAR; INTRAVENOUS ONCE
Status: DISCONTINUED | OUTPATIENT
Start: 2018-09-27 | End: 2018-09-27

## 2018-09-27 RX ADMIN — OXYCODONE HYDROCHLORIDE AND ACETAMINOPHEN 1 TABLET: 10; 325 TABLET ORAL at 03:09

## 2018-09-27 RX ADMIN — IBUPROFEN 800 MG: 400 TABLET ORAL at 06:09

## 2018-09-27 NOTE — DISCHARGE SUMMARY
Admitting Diagnosis: IUP at 35 wga, di-di twins, twin B IUGR, oligo and elevated dopplers, h/o , unwanted fertility, h/o left salpingectomy   Discharge Diagnosis: s/p RLTCD, right tubal ligation, GHTN   Procedure: RLTCD /R tubal ligation  Service: OB-GYN, [place]   Consults: none   Hospital Course: Patient was admitted to L&D for repeat . She had a repeat  of a viable female infant x2.  She was transferred to mother baby in stable condition.  Her recovery was uncomplicated and by post operative day 3 she was tolerating PO without N/V, ambulating without difficulty, her pain was well controlled with PO pain medications and she had passed flatus. She was both breast and bottlefeeding. She had has several mild range blood pressures denied RUQ pain, HA or scotoma and had no signs of pre-e clinically. She was stable and ready for discharge.   Medications: OTC ibuprofen, Percocet  Disposition: home   Condition: stable for discharge  Follow up: 1 week for incision check and 6 weeks for post-partum check  Instructions: Keep incision clean and dry, continue ambulation, take medications as needed and take stool softener as needed, pelvic rest until instructed otherwise by physician, we discussed pre-e precautions   Call or return to ED for fever >100.4, foul smelling vaginal discharge, heavy vaginal bleeding, abdominal pain not responsive to medications or other concerns.

## 2018-09-27 NOTE — PLAN OF CARE
Reviewed discharge documentation and medications with patient. Pt verbalized f/u appt is to be scheduled w/ OB. Pt is bonding with baby. Visits baby in NICU, twin at [facility]. Family support noted at bedside.  Mother shows she is able to care for herself and infants. Patient reports having help at home. Pt transported via wheelchair for discharge.

## 2018-09-27 NOTE — DISCHARGE INSTRUCTIONS
Breastfeeding Discharge Instructions       Feed the baby at the earliest sign of hunger or comfort  o Hands to mouth, sucking motions  o Rooting or searching for something to suck on  o Dont wait for crying - it is a sign of distress     The feedings may be 8-12 times per 24hrs and will not follow a schedule   Avoid pacifiers and bottles for the first 4 weeks   Alternate the breast you start the feeding with, or start with the breast that feels the fullest   Switch breasts when the baby takes himself off the breast or falls asleep   Keep offering breasts until the baby looks full, no longer gives hunger signs, and stays asleep when placed on his back in the crib   If the baby is sleepy and wont wake for a feeding, put the baby skin-to-skin dressed in a diaper against the mothers bare chest   Sleep near your baby   The baby should be positioned and latched on to the breast correctly  o Chest-to-chest, chin in the breast  o Babys lips are flipped outward  o Babys mouth is stretched open wide like a shout  o Babys sucking should feel like tugging to the mother  - The baby should be drinking at the breast:  o You should hear swallowing or gulping throughout the feeding  o You should see milk on the babys lips when he comes off the breast  o Your breasts should be softer when the baby is finished feeding  o The baby should look relaxed at the end of feedings  o After the 4th day and your milk is in:  o The babys poop should turn bright yellow and be loose, watery, and seedy  o The baby should have at least 3-4 poops the size of the palm of your hand per day  o The baby should have at least 5-6 wet diapers per day  o The urine should be light yellow in color  You should drink when you are thirsty and eat a healthy diet when you are    hungry.     Take naps to get the rest you need.   Take medications and/or drink alcohol only with permission of your obstetrician    or the babys pediatrician.  You can  also call the [facility],   ([phone]), Monday-Friday, 8am-5pm Central time, to get the most   up-to-date evidence-based information on the use of medications during   pregnancy and breastfeeding.      The baby should be examined by a pediatrician at 3-5 days of age.  Once your   milk comes in, the baby should be gaining at least ½ - 1oz each day and should be back to birthweight no later than 10-14 days of age.          Community Resources    [facility] Breastfeeding Warmline: [phone]  Local [facility] clinics: provide incentives and breastpumps to eligible mothers  [employer] (LLLI):  mother-to-mother support group website        www.Haolianluo.Prismatic  Local La Leche League mother-to-mother support groups:        www.PumpUp        La Leche League [facility]   Dr. Abhi Avalos website for latch videos and general information:        www.breastfeedinginc.ca  [facility] is a call center that provides information about the safety of taking medications while breastfeeding.  Call 1-546.323.1770, M-F, 8am-5pm, [place].  International Lactation Consultant Association provides resources for assistance:        www.ilca.org  [facility] Breastfeeding Coalition provides informationand resources for parents  and the community    http://ChristianaCareastfeeding.org     Gail Garcia is a mom-to-mom support group:                             www.nolanesting.FuelMyBlog//breastfeedng-support/  Part[employer] for Healthy Babies:  1-602-489-BABY(5064)  [employer]: a breastfeeding support group for women of color, 531.239.9629        Pumping    Preparation and Hygiene:    1. Shower daily.  2. Wear a clean bra each day and wash daily in warm soapy water.  3. Change wet or moist breast pads frequently.  Moist pads can promote growth of germs.  4. Actively wash your hands, paying close attention to the area around and under your fingernails, thoroughly with soap and water for 15 seconds before pumping or  handling your milk.  Re-wash your hands if you touch anything (scratching your nose, answering the phone, etc) while pumping or handling your milk.   5. Before pumping your breasts, assemble the pump collection kit and have ready the sterile container and labels.  Place these items on a clean surface next to the breastpump.  6. Each time after you have finished pumping, take apart all of the parts of the breastpump collection kit and place them in a separate cleaning container (do not place them in the sink).  Be sure to remove the yellow valve from the breastshield and separate the white membrane from the yellow valve.  Rinse all of these parts with cool water.  Then use a new sponge and/or bottle brush and dishwashing detergent to clean the parts.  Rinse off the soapy water with cool water and air dry on a clean towel covered with a clean cloth.  All parts may also be washed after each use in the top rack of a .  7. Once each day, sterilize all of the parts of the breastpump collection kit.  This can be done by boiling the kit parts for 10 minutes or by using a Quick Clean Micro-Steam Bag made by [employer]  8. If condensation appears in the tubing, continue to run the pump with the tubing attached for 1-2 minutes or until the tubing is dry.   9. Notify your babys nurse or doctor if you become ill or need to take any medication, even over-the-counter medicines.        Collection and Storage of Expressed Breastmilk:         1. Pump your breasts at least 8-10 times every 24 hours.  Double pump (both breasts at  the same time) for at least 15-20 minutes using the most suction that is comfortable.    2. Write the date and time of pumping and the name of any medications you are takingon the babys pre-printed hospital identification label.   3. Place your babys pre-printed hospital identification label on each container of breastmilk.  Additional pre-printed labels can be obtained from your babys nurse.   If your expressed breastmilk is not correctly labeled, the nurse cannot feed the milk to the baby.       4. Place a brightly colored sticker on the top of each container of milk pumped during the first 30 days.  This identifies the milk as special and having higher levels of nutrients and anti-infective properties that are so important for your baby.  Additional stickers can be obtained from the lactation consultants or your babys nurse.  5.   Do not touch the inside of the storage containers or lids.  6.      Pour the amount of expressed milk needed for 1 of your babys feedings into each   storage container. Use a new container(s) for each pumping.  Additional storage   containers can be obtained from your babys nurse.        7.       Tightly screw the lid onto the container and place immediately into the       refrigerator fordaily transportation to the hospital.   Do not freeze your milk      unless asked to do so by your babys nurse.  However, if you are not able to      visit your baby each day, place the expressed breastmilk in the freezer.  8.   Expressed breastmilk should be refrigerated or frozen within 1 hour of      pumping.  9.      Do not store expressed breastmilk on the door of your refrigerator or freezer where the temperature is warmer.         Transportation of Expressed Breastmilk:    1. Refrigerated breastmilk or frozen milk should be packed tightly together in a cooler with frozen, blue gel-packs to keep the milk frozen.  DO NOT USE ICE CUBES (WET ICE) TO TRANSPORT FROZEN MILK.   A clean towel can be used to fill any extra space between containers of frozen milk.  2.    Bring your expressed milk from home each time you visit the baby.        Patient Discharge Instructions for Postpartum Women    Resume Regular Diet  Increase activity gradually, no heavy lifting  Shower  No tampons, douching or sexual intercourse.  Discuss birth control options with your physician.  Wear a support  "bra  Return to work/school when you've been cleared by a physician    Call your physician if     *Fever of 100.4 or higher  *Persistent nausea/ vomiting  *Incisional drainage  *Heavy vaginal bleeding or large clots (Heavy bleeding is soaking 1 pad in an hour)  *Swelling and pain in arms or legs  *Severe headaches, blurred vision or fainting  *Shortness of breath  *Frequency and burning with urination  *Signs of postpartum depression, discuss these signs with your physician    Call lactation services for questions regarding feeding, nipple and breast care, and general questions about lactation.  They can be reached at [phone]         Understanding Postpartum Depression    You've just had a baby.  You know you should be excited and happy.  But instead you find yourself crying for no reason.  You may have trouble coping with your daily tasks.  You feel sad, tired, and hopeless most of the time.  You may even feel ashamed or guilty.  But what you're going through is not your fault and you can feel better.  Talk to your doctor.  He or she can help.    Depression After Childbirth    You may be weepy and tired right after giving birth.  These feelings are normal.  They're sometimes called the "baby blues."  These blues go away 2-3 weeks.  However, postpartum (meaning "after birth") depression lasts much longer and is more sever than the "baby blues."  It can make you feel sad and hopeless.  You may also fear that your baby will be harmed and worry about being a bad mother.      What is Depression?    Depression is a mood disorder that affects the way you think and feel.  The most common symptom is a feeling of deep sadness.  You may also feel as if you just can't cope with life.    Other symptoms include:      * Gaining or loosing weight  * Sleeping too much or too little  * Feeling tired all the time  * Feeling restless  * Fears of harming your baby   * Lack of interest in your baby  * Feeling worthless or guilty  * " No longer finding pleasure in things you used to  * Having trouble thinking clearly or making decisions  * Thoughts of hurting yourself or your baby    What Causes Postpartum Depression    The exact causes of postpartum depression isn't known.  It may be due to changes in your hormones during and after childbirth.  You may also be tired from caring for your baby and adjusting to being a mother.  All these factors may make you feel depressed.  In some cases, your genes may also play a role.    Depression Can Be Treated    The good news is that there are many ways to treat postpartum depression.  Talking to your doctor is the first step toward feeling better.    Resources:    * National [place] of Mental Health  -- 012-384-3900    www.nimh.nih.gov    * National [place] on Mental Illness --310.668.6542    Www.micah.org    * Mental Health [place] -- 860.156.2028     Www.nmha.org    * National Suicide Hotline --125.889.7134 (800-SUICIDE)    7716-1779 [employer]  All rights reserved.  This information is not intended as a substitute for professional medical care.  Always follow up with your healthcare professional's instructions.

## 2018-09-29 NOTE — PROGRESS NOTES
Indication  ========    Di/Di twin gestation: ultrasound for fetal growths, amniotic fluid volumes and BPP without NST .    History  ======    General History  Other: Di/Di twin gestation  AMA  C/S x 1  Previous Outcomes  Preg. no. 1  Outcome: Live birth  Gender: male  Details: C/S   2  Para 1  [place] living children (L) 1    Pregnancy History  ==============    Maternal Lab Tests  Genetic screening declined.      Maternal Assessment  =================    Weight 84 kg  Weight (lb) 185 lb  BP syst 112 mmHg  BP diast 60 mmHg    Pregnancy  =========    Twin pregnancy. Dichorionic-diamniotic. Number of fetuses: 2.    Dating  ======    LMP on: 2018  Cycle: regular cycle  GA by LMP 33 w + 4 d  MARY by LMP: 2018  Ultrasound examination on: 2018  GA by U/S based upon: AC, BPD, Femur, HC  GA by U/S 38 w + 1 d  MARY by U/S: 10/7/2018  GA by U/S based upon (Fetus 2): AC, BPD, Femur, HC  GA by U/S (Fetus 2) 31 w + 5 d  MARY by U/S (Fetus 2): 2018  Assigned: Dating performed on 2018, based on ultrasound (Pregnancy 1: CRL)  Assigned GA 35 w + 2 d  Assigned MARY: 10/27/2018    General Evaluation (1)  =================    Cardiac activity: present.  bpm.  Fetal movements: visualized.  Presentation: cephalic left, presenting.  Placenta:  Placental site: posterior.  Umbilical cord: Cord vessels: 3 vessel cord.  Amniotic fluid: Amount of AF: normal amount. MVP 7.0 cm.    Biophysical Profile (1)  =================    2: Fetal breathing movements  2: Gross body movements  2: Fetal tone  2: Amniotic fluid volume  : Biophysical profile score    Fetal Biometry (1)  ==============    Fetal Biometry  BPD 92.9 mm 37w 5d Hadlock  .4 mm 39w 0d Hadlock  .6 mm 37w 6d Hadlock  Femur 73.8 mm 37w 5d Hadlock  EFW 3,356 g 80% Andre  Calculated by: Hadlock (BPD-HC-AC-FL)  EFW (lb) 7 lb  EFW (oz) 6 oz  EFW discordance 50.7 %  HC / AC 1.00  FL / BPD 0.79  FL / AC 0.22  MVP 7.0 cm    bpm    Fetal Anatomy (1)  ==============    Cranium: normal  4-chamber view: normal  Heart / Thorax: Patient seen by Peds Cardiology on 09/06/2018: Normal fetal echocardiogram.  Stomach: normal  Kidneys: normal  Bladder: normal  Wants to know gender: yes  Other: A full anatomic survey has been previously performed.    General Evaluation (2)  =================    Cardiac activity: present.  bpm.  Fetal movements: visualized.  Presentation: oblique, maternal right, head maternal RUQ.  Placenta:  Placental site: anterior.  Umbilical cord: Cord vessels: 3 vessel cord.  Amniotic fluid: Amount of AF: mildly reduced. MVP 3.1 cm. (3.07 x 1.83) cm pocket; another pocket seen measures (2.24 x 1.75) cm.    Biophysical Profile (2)  =================    2: Fetal breathing movements  2: Gross body movements  2: Fetal tone  0: Amniotic fluid volume  6/8: Biophysical profile score    Fetal Biometry (2)  ==============    Fetal Biometry  BPD 80.2 mm 32w 1d Hadlock  .4 mm 32w 6d Hadlock  .6 mm 29w 2d Hadlock  Femur 62.6 mm 32w 3d Hadlock  EFW 1,655 g <1% Andre  Calculated by: Hadlock (BPD-HC-AC-FL)  EFW (lb) 3 lb  EFW (oz) 10 oz  EFW discordance 50.7 %  HC / AC 1.19  FL / BPD 0.78  FL / AC 0.25  MVP 3.1 cm   bpm    Fetal Anatomy (2)  ==============    Cranium: appears normal  4-chamber view: documented previously  Heart / Thorax: Patient seen by Peds Cardiology on 09/06/18: Normal fetal echocardiogram.  Stomach: normal  Kidneys: normal  Bladder: normal  Wants to know gender: yes  Other: A full anatomic survey has been previously performed.    Fetal Doppler (2)  =============    Umbilical Cord  Umbilical A S / D 4.60 >99% Lindsey    Impression  =========    1. 35w 2d dichorionic diamniotic twin gestation  2. Discordant interval fetal growth with inter twin EFW discrepancy of 50.7%  3. Twin A:       -EFW = 3356 gms at 80%       -amniotic fluid volume wnl (MVP 7.0cm)       -BPP without NST = 8/8        -Cephalic presentation  4. Twin B: severe fetal growth restriction       -EFW = 1655 gms at <1%       -oligohydramnios       -umbilical artery S/D ratio > 99%       -BPP without NST = 8/8       -Oblique presentation .    Recommendation  ==============    Recommend delivery: patient sent to labor and delivery  Dr. Beach was contacted via telephone about ultrasound evaluation and recommendation for delivery.

## 2018-10-01 ENCOUNTER — POSTPARTUM VISIT (OUTPATIENT)
Dept: OBSTETRICS AND GYNECOLOGY | Facility: CLINIC | Age: 33
End: 2018-10-01
Payer: COMMERCIAL

## 2018-10-01 VITALS
SYSTOLIC BLOOD PRESSURE: 124 MMHG | BODY MASS INDEX: 28.88 KG/M2 | HEIGHT: 63 IN | WEIGHT: 163 LBS | DIASTOLIC BLOOD PRESSURE: 70 MMHG

## 2018-10-01 DIAGNOSIS — Z98.891 H/O: C-SECTION: Primary | ICD-10-CM

## 2018-10-01 PROCEDURE — 99999 PR PBB SHADOW E&M-EST. PATIENT-LVL III: CPT | Mod: PBBFAC,,, | Performed by: OBSTETRICS & GYNECOLOGY

## 2018-10-01 PROCEDURE — 0503F POSTPARTUM CARE VISIT: CPT | Mod: S$GLB,,, | Performed by: OBSTETRICS & GYNECOLOGY

## 2018-10-01 NOTE — PROGRESS NOTES
CC: follow up delivery    HPI:   Loulou Gann is a 33 y.o. here for f/u /right tubal ligation, left tube surgically absent on  with twins. She reports normal bowel movements and urination. Pain is controlled with OTC medications. She is pumping.  Denies depression.     Vitals:    10/01/18 1004   BP: 124/70       PHYSICAL EXAM:   APPEARANCE: Well nourished, well developed, in no acute distress.  ABDOMEN: Soft. No tenderness or masses. No hernias. well healed Pfannenstiel incision    S/p RLTCD/R tubal ligation     PLAN:   1. RTC in 5 weeks for final exam   2. GHTN: BP normal with no symptoms, we discussed pre-e symptoms and what to look out for.   
no

## 2018-10-09 ENCOUNTER — TELEPHONE (OUTPATIENT)
Dept: OBSTETRICS AND GYNECOLOGY | Facility: CLINIC | Age: 33
End: 2018-10-09

## 2018-11-07 ENCOUNTER — OFFICE VISIT (OUTPATIENT)
Dept: OBSTETRICS AND GYNECOLOGY | Facility: CLINIC | Age: 33
End: 2018-11-07
Payer: COMMERCIAL

## 2018-11-07 VITALS
BODY MASS INDEX: 27.64 KG/M2 | SYSTOLIC BLOOD PRESSURE: 104 MMHG | HEIGHT: 63 IN | WEIGHT: 156 LBS | DIASTOLIC BLOOD PRESSURE: 60 MMHG

## 2018-11-07 DIAGNOSIS — Z98.51 HISTORY OF BILATERAL TUBAL LIGATION: ICD-10-CM

## 2018-11-07 DIAGNOSIS — Z98.891 H/O: C-SECTION: Primary | ICD-10-CM

## 2018-11-07 PROCEDURE — 99999 PR PBB SHADOW E&M-EST. PATIENT-LVL III: CPT | Mod: PBBFAC,,, | Performed by: OBSTETRICS & GYNECOLOGY

## 2018-11-07 PROCEDURE — 0503F POSTPARTUM CARE VISIT: CPT | Mod: S$GLB,,, | Performed by: OBSTETRICS & GYNECOLOGY

## 2018-11-07 NOTE — PROGRESS NOTES
CC: follow up delivery    HPI:   Loulou Gann is a 33 y.o. here for f/u /right tubal ligation, left tube surgically absent on  with twins. She reports normal bowel movements and urination. Pain is controlled with OTC medications. She is pumping.  Denies depression.     Vitals:    18 1126   BP: 104/60       PHYSICAL EXAM:   APPEARANCE: Well nourished, well developed, in no acute distress.  ABDOMEN: Soft. No tenderness or masses. No hernias. well healed Pfannenstiel incision   Pelvic: normal external genitalia, normal vaginal mucosa, normal cervix, normal sized uterus    S/p RLTCD/R tubal ligation     PLAN:   1. Cleared to go back to normal activities.   2. GHTN: BP normal with no symptoms, we discussed pre-e symptoms and what to look out for.

## 2019-01-27 NOTE — PROGRESS NOTES
2018  S/p RLTCT/R tubal ligation    Patient is doing well with no complaints. Tolerating Po without N/V. Passing flatus. Ambulated without difficulty. Pain controlled with pain medications. Lochia is less than menses. Is bottlefeeding but pumping.     Temp:  [97.8 °F (36.6 °C)-98.2 °F (36.8 °C)] 97.8 °F (36.6 °C)  Pulse:  [75-78] 76  Resp:  [18] 18  BP: (121-139)/(76-84) 121/76      In bed, NAD, RRR, CTA B, abd S/NT/ND + BS FF and below umbilicus, dressing c/d/i with small area of blood that hasn't gone outside lines ext: 1+ pitting edema or tenderness DTR 3+ but no clonus; small area of flaking skin around both nipples    A/P: 33 y.o.   s/p RLTCD/BTL PPD 2   With anemia and GHTN     1. Continue routine care  2. GHTN: labs stable, will recheck BP in 1 week with incision check  3. Anemia: stable, will continue to monitor and start iron  4. Appears to be dry skin around nipples, will apply aquofor or barrier cream, will re-evaluate in 1 week for improvement. Discussed with her to let us know if it gets red or painful.    CHEST CONGESTION/COUGH

## 2023-06-26 NOTE — TELEPHONE ENCOUNTER
Pt states baby B remains hospitalized at [facility] but will probably come home next week. States she has not been pumping, baby A just formula feeding. Concerned about baby being constipated. States she informed her pediatrician and is not concerned as long as baby is stooling once per day. Mother states baby seems to strain and passes gas but no stools frequently. States she has a pediatrician appt tomorrow. Instructed to inform ped of continued low stools and baby's need to strain. Pt states baby is on Similac. Encouraged to ask pediatrician about different formula to see if it's the cause of the constipation.    
decreased strength

## 2023-06-29 ENCOUNTER — HOSPITAL ENCOUNTER (EMERGENCY)
Facility: HOSPITAL | Age: 38
Discharge: HOME | End: 2023-06-29
Attending: EMERGENCY MEDICINE

## 2023-06-29 VITALS
SYSTOLIC BLOOD PRESSURE: 118 MMHG | HEART RATE: 80 BPM | WEIGHT: 135 LBS | TEMPERATURE: 98 F | DIASTOLIC BLOOD PRESSURE: 77 MMHG | HEIGHT: 63 IN | BODY MASS INDEX: 23.92 KG/M2 | RESPIRATION RATE: 14 BRPM | OXYGEN SATURATION: 99 %

## 2023-06-29 DIAGNOSIS — S39.012A STRAIN OF LUMBAR REGION, INITIAL ENCOUNTER: Primary | ICD-10-CM

## 2023-06-29 LAB
B-HCG UR QL: NEGATIVE
BILIRUB UR QL STRIP: NEGATIVE
CLARITY UR: CLEAR
COLOR UR: COLORLESS
CTP QC/QA: YES
GLUCOSE UR QL STRIP: NEGATIVE
HGB UR QL STRIP: NEGATIVE
KETONES UR QL STRIP: NEGATIVE
LEUKOCYTE ESTERASE UR QL STRIP: NEGATIVE
NITRITE UR QL STRIP: NEGATIVE
PH UR STRIP: 7 [PH] (ref 5–8)
PROT UR QL STRIP: NEGATIVE
SP GR UR STRIP: 1.01 (ref 1–1.03)
URN SPEC COLLECT METH UR: ABNORMAL
UROBILINOGEN UR STRIP-ACNC: NEGATIVE EU/DL

## 2023-06-29 PROCEDURE — 25000003 PHARM REV CODE 250

## 2023-06-29 PROCEDURE — 99284 EMERGENCY DEPT VISIT MOD MDM: CPT

## 2023-06-29 PROCEDURE — 63600175 PHARM REV CODE 636 W HCPCS

## 2023-06-29 PROCEDURE — 96372 THER/PROPH/DIAG INJ SC/IM: CPT

## 2023-06-29 PROCEDURE — 81003 URINALYSIS AUTO W/O SCOPE: CPT | Performed by: EMERGENCY MEDICINE

## 2023-06-29 PROCEDURE — 81025 URINE PREGNANCY TEST: CPT | Performed by: EMERGENCY MEDICINE

## 2023-06-29 RX ORDER — NAPROXEN 500 MG/1
500 TABLET ORAL 2 TIMES DAILY
Qty: 10 TABLET | Refills: 0 | Status: SHIPPED | OUTPATIENT
Start: 2023-06-29 | End: 2023-07-04

## 2023-06-29 RX ORDER — LIDOCAINE 50 MG/G
1 PATCH TOPICAL
Status: DISCONTINUED | OUTPATIENT
Start: 2023-06-29 | End: 2023-06-29 | Stop reason: HOSPADM

## 2023-06-29 RX ORDER — LIDOCAINE 50 MG/G
1 PATCH TOPICAL ONCE
Qty: 10 PATCH | Refills: 0 | Status: SHIPPED | OUTPATIENT
Start: 2023-06-29 | End: 2023-06-29

## 2023-06-29 RX ORDER — KETOROLAC TROMETHAMINE 30 MG/ML
30 INJECTION, SOLUTION INTRAMUSCULAR; INTRAVENOUS
Status: COMPLETED | OUTPATIENT
Start: 2023-06-29 | End: 2023-06-29

## 2023-06-29 RX ADMIN — KETOROLAC TROMETHAMINE 30 MG: 30 INJECTION, SOLUTION INTRAMUSCULAR; INTRAVENOUS at 09:06

## 2023-06-29 RX ADMIN — LIDOCAINE 1 PATCH: 50 PATCH CUTANEOUS at 09:06

## 2023-06-29 NOTE — ED NOTES
Patient identifiers verified by spelling and stated name on armband along with .    Review of patient's allergies indicates:  No Known Allergies    APPEARANCE: Alert, oriented x 4, and in no acute distress.  NEURO: 5/5 strength major flexors/extensors bilaterally. Sensory intact to light touch bilaterally. Tracey coma scale: eyes open spontaneously-4, oriented & converses-5, obeys commands-6. No neurological abnormalities. Denies HA, dizziness, photophobia.  CARDIAC: Normal rate and rhythm through apical/radial pulse, S1 and S2 noted, denies CP.   RESPIRATORY:Normal rate and effort, breath sounds clear bilaterally throughout chest anterior and posterior. Respirations are equal and unlabored, no obvious signs of distress. No SOB reported.  PERIPHERAL VASCULAR: +2 peripheral pulses present. Normal cap refill <3sec. No edema. Warm to touch.   GASTRO: Abdomen soft, flat, bowel sounds normal and active in all 4 quadrants, no tenderness, no abdominal distention. Denies NVD.  MUSC: +intermittent mid-lower back pain that does not radiate; Full ROM. No bony tenderness or soft tissue tenderness. No obvious deformity.  SKIN: Skin is warm and dry, normal skin turgor, mucous membranes moist.   GENITOURINARY: Voids spontaneously, denies itching, burning, hematuria.

## 2023-06-29 NOTE — ED PROVIDER NOTES
Encounter Date: 2023       History     Chief Complaint   Patient presents with    Back Pain     Pt states that she is having increase back pain since she had her twins 4 years ago. Pt states that she wants a shot of something to make the pain go away. Pt denies any recent trauma.      38-year-old female with no significant past medical history now presents with chief complaint of severe pain of lower back.  Patient states that for the past 4 years she has been experiencing intermittent back pain in similar fashion to this, and she is experiencing an acute flare of her back pain which started 2 days ago.  Patient reports that the pain is all on localized over the right lower aspect of her back, worsened by movement, and not associated with dysuria, fever, loss of bladder or bowel function, previous trauma, or numbness/ weakness/ tingling of her lower limbs.    The history is provided by the patient.   Review of patient's allergies indicates:  No Known Allergies  Past Medical History:   Diagnosis Date    Anemia     Dichorionic diamniotic twin pregnancy in first trimester 3/14/2018    Dichorionic diamniotic twin pregnancy in third trimester 3/14/2018     Past Surgical History:   Procedure Laterality Date     SECTION       SECTION N/A 2018    Procedure:  SECTION;  Surgeon: Agata Beach MD;  Location: [facility] L&D;  Service: OB/GYN;  Laterality: N/A;    OOPHORECTOMY       Family History   Problem Relation Age of Onset    No Known Problems Son     Congenital heart disease Neg Hx     Pacemaker/defibrilator Neg Hx     Early death Neg Hx      Social History     Tobacco Use    Smoking status: Never    Smokeless tobacco: Never   Substance Use Topics    Alcohol use: No    Drug use: Yes     Types: Marijuana     Comment: last time was when she found out she was preggnant     Review of Systems  See HPI    Physical Exam     Initial Vitals [23 0840]   BP Pulse Resp Temp SpO2   118/77 80 14 98.2 °F  (36.8 °C) 99 %      MAP       --         Physical Exam    Nursing note and vitals reviewed.    Gen: AxOx3, well nourished, appears stated age, no pallor, no jaundice, appears well hydrated  Eye: EOMI, no scleral icterus, no periorbital edema or ecchymosis  Head: Normocephalic, atraumatic, no lesions, scalp appears normal  ENT: Neck supple, no stridor, no masses, no drooling or voice changes  CVS: All distal pulses intact with normal rate and rhythm, no JVD, normal S1/S2, no murmur  Pulm: Normal breath sounds, no wheezes, rales or rhonchi, no increased work of breathing  Abd:  Nondistended, soft, nontender, no organomegaly, no CVAT  Ext: No edema, no lesions, rashes, or deformity  Tight and tender muscle spasm paraspinal to L3-L5.  Bilateral straight leg raise negative   Neuro: GCS15, moving all extremities, gait intact, face grossly symmetric  Power/tone/ sensation intact of bilateral lower limbs.  Psych: normal affect, cooperative, well groomed, makes good eye contact      ED Course   Procedures  Labs Reviewed   URINALYSIS, REFLEX TO URINE CULTURE - Abnormal; Notable for the following components:       Result Value    Color, UA Colorless (*)     All other components within normal limits    Narrative:     Specimen Source->Urine   POCT URINE PREGNANCY          Imaging Results    None          Medications   LIDOcaine 5 % patch 1 patch (1 patch Transdermal Patch Applied 6/29/23 0917)   ketorolac injection 30 mg (30 mg Intramuscular Given 6/29/23 0917)     Medical Decision Making:   Initial Assessment:   38-year-old female presenting with 2 day history of lower back pain. Patient is able to vocalise, breathing spontaneously, hemodynamically stable, oriented, moving all 4 limbs spontaneously.  Examination consistent paraspinal muscle spasm around L3-L5.   Differential Diagnosis:   Radiculopathy secondary to muscle spasm  Doubt vertebral fracture  Doubt vertebral abscess  Doubt cauda equina syndrome  Doubt conus  medullaris  ED Management:  History and physical examination radiculopathy secondary to muscle spasm and low suspicion for red flags of low back pain. UPT negative and UA reassuring. Patient was given Lidoderm patch and Toradol in the emergency department and discharged with Lidoderm patch and naproxen with plan to perform light movements/exercises suggested.  Return precautions provided for following 48 hours including red flags of back pain.          Attending Attestation:   Physician Attestation Statement for Resident:  As the supervising MD   Physician Attestation Statement: I have personally seen and examined this patient.   I agree with the above history.  -:   As the supervising MD I agree with the above PE.     As the supervising MD I agree with the above treatment, course, plan, and disposition.    I have reviewed and agree with the residents interpretation of the following: lab data.  I have reviewed the following: old records at this facility.              ED Course as of 06/29/23 0953   Thu Jun 29, 2023   0909 BP: 118/77 [PM]   0909 Temp: 98.2 °F (36.8 °C) [PM]   0909 Pulse: 80 [PM]   0909 Preg Test, Ur: Negative [PM]   0952 Occult Blood UA: Negative [PM]   0952 Leukocytes, UA: Negative [PM]   0952 NITRITE UA: Negative [PM]      ED Course User Index  [PM] Renetta Carcamo MD                 Clinical Impression:   Final diagnoses:  [S39.012A] Strain of lumbar region, initial encounter (Primary)        ED Disposition Condition    Discharge Stable          ED Prescriptions       Medication Sig Dispense Start Date End Date Auth. Provider    LIDOcaine (LIDODERM) 5 % (Expires today) Place 1 patch onto the skin once. Remove & Discard patch within 12 hours or as directed by MD for 1 dose 10 patch 6/29/2023 6/29/2023 Renetta Carcamo MD    naproxen (NAPROSYN) 500 MG tablet Take 1 tablet (500 mg total) by mouth 2 (two) times daily. for 5 days 10 tablet 6/29/2023 7/4/2023 Renetta Carcamo MD          Follow-up  Information       Follow up With Specialties Details Why Contact Info    [facility] - Emergency Dept Emergency Medicine  As needed, If symptoms worsen [address] [facility] [id number] [phone]    PCP  Schedule an appointment as soon as possible for a visit                Renetta Carcamo MD  Resident  06/29/23 2490       Oj López MD  06/29/23 1001

## (undated) DEVICE — DRESSING AQUACEL FOAM 4 X 12